# Patient Record
Sex: FEMALE | Race: WHITE | NOT HISPANIC OR LATINO | Employment: OTHER | URBAN - METROPOLITAN AREA
[De-identification: names, ages, dates, MRNs, and addresses within clinical notes are randomized per-mention and may not be internally consistent; named-entity substitution may affect disease eponyms.]

---

## 2023-04-05 DIAGNOSIS — K21.9 GASTROESOPHAGEAL REFLUX DISEASE WITHOUT ESOPHAGITIS: Primary | ICD-10-CM

## 2023-04-05 RX ORDER — PANTOPRAZOLE SODIUM 40 MG/1
40 TABLET, DELAYED RELEASE ORAL DAILY
Qty: 90 TABLET | Refills: 3 | Status: SHIPPED | OUTPATIENT
Start: 2023-04-05 | End: 2024-05-23 | Stop reason: SDUPTHER

## 2023-05-02 DIAGNOSIS — J30.9 ALLERGIC RHINITIS, UNSPECIFIED SEASONALITY, UNSPECIFIED TRIGGER: Primary | ICD-10-CM

## 2023-05-08 RX ORDER — MONTELUKAST SODIUM 10 MG/1
TABLET ORAL
Qty: 90 TABLET | Refills: 3 | Status: SHIPPED | OUTPATIENT
Start: 2023-05-08 | End: 2024-04-26 | Stop reason: SDUPTHER

## 2023-05-16 PROBLEM — J30.9 ALLERGIC RHINITIS: Status: ACTIVE | Noted: 2023-05-16

## 2023-05-16 PROBLEM — R05.3 CHRONIC COUGH: Status: ACTIVE | Noted: 2023-05-16

## 2023-05-16 PROBLEM — M19.90 OSTEOARTHRITIS: Status: ACTIVE | Noted: 2023-05-16

## 2023-05-16 PROBLEM — J45.909 ASTHMA (HHS-HCC): Status: ACTIVE | Noted: 2023-05-16

## 2023-05-16 PROBLEM — N28.9 RENAL INSUFFICIENCY: Status: ACTIVE | Noted: 2023-05-16

## 2023-05-16 PROBLEM — J47.9 BRONCHIECTASIS (MULTI): Status: ACTIVE | Noted: 2023-05-16

## 2023-05-16 PROBLEM — N94.89 PELVIC CONGESTION SYNDROME: Status: ACTIVE | Noted: 2023-05-16

## 2023-05-16 PROBLEM — R63.4 ABNORMAL WEIGHT LOSS: Status: ACTIVE | Noted: 2023-05-16

## 2023-05-16 PROBLEM — I73.00 RAYNAUD PHENOMENON: Status: ACTIVE | Noted: 2023-05-16

## 2023-05-16 PROBLEM — E04.1 COLLOID THYROID NODULE: Status: ACTIVE | Noted: 2023-05-16

## 2023-05-16 PROBLEM — M19.90 ARTHRITIS: Status: ACTIVE | Noted: 2023-05-16

## 2023-05-16 PROBLEM — E78.5 HYPERLIPIDEMIA: Status: ACTIVE | Noted: 2023-05-16

## 2023-05-16 PROBLEM — N18.30 CKD (CHRONIC KIDNEY DISEASE) STAGE 3, GFR 30-59 ML/MIN (MULTI): Status: ACTIVE | Noted: 2023-05-16

## 2023-05-16 PROBLEM — K21.9 GERD (GASTROESOPHAGEAL REFLUX DISEASE): Status: ACTIVE | Noted: 2023-05-16

## 2023-05-16 PROBLEM — R91.1 LUNG NODULE: Status: ACTIVE | Noted: 2023-05-16

## 2023-05-17 ENCOUNTER — OFFICE VISIT (OUTPATIENT)
Dept: PRIMARY CARE | Facility: CLINIC | Age: 78
End: 2023-05-17
Payer: MEDICARE

## 2023-05-17 VITALS
SYSTOLIC BLOOD PRESSURE: 118 MMHG | RESPIRATION RATE: 16 BRPM | OXYGEN SATURATION: 98 % | HEART RATE: 61 BPM | WEIGHT: 115 LBS | DIASTOLIC BLOOD PRESSURE: 70 MMHG | TEMPERATURE: 97.5 F | BODY MASS INDEX: 20.37 KG/M2

## 2023-05-17 DIAGNOSIS — E46 PROTEIN-CALORIE MALNUTRITION, UNSPECIFIED SEVERITY (MULTI): ICD-10-CM

## 2023-05-17 DIAGNOSIS — Z12.31 ENCOUNTER FOR SCREENING MAMMOGRAM FOR MALIGNANT NEOPLASM OF BREAST: Primary | ICD-10-CM

## 2023-05-17 DIAGNOSIS — J47.9 BRONCHIECTASIS WITHOUT COMPLICATION (MULTI): ICD-10-CM

## 2023-05-17 DIAGNOSIS — N18.31 STAGE 3A CHRONIC KIDNEY DISEASE (MULTI): ICD-10-CM

## 2023-05-17 PROCEDURE — 1170F FXNL STATUS ASSESSED: CPT | Performed by: INTERNAL MEDICINE

## 2023-05-17 PROCEDURE — G0439 PPPS, SUBSEQ VISIT: HCPCS | Performed by: INTERNAL MEDICINE

## 2023-05-17 PROCEDURE — 1157F ADVNC CARE PLAN IN RCRD: CPT | Performed by: INTERNAL MEDICINE

## 2023-05-17 PROCEDURE — 1036F TOBACCO NON-USER: CPT | Performed by: INTERNAL MEDICINE

## 2023-05-17 PROCEDURE — 1159F MED LIST DOCD IN RCRD: CPT | Performed by: INTERNAL MEDICINE

## 2023-05-17 PROCEDURE — 1160F RVW MEDS BY RX/DR IN RCRD: CPT | Performed by: INTERNAL MEDICINE

## 2023-05-17 RX ORDER — IPRATROPIUM BROMIDE 21 UG/1
1 SPRAY, METERED NASAL 2 TIMES DAILY
COMMUNITY
Start: 2018-09-26 | End: 2023-11-01 | Stop reason: ALTCHOICE

## 2023-05-17 RX ORDER — NAPROXEN 500 MG/1
500 TABLET ORAL
COMMUNITY
End: 2023-07-10 | Stop reason: SDUPTHER

## 2023-05-17 RX ORDER — LATANOPROST 50 UG/ML
1 SOLUTION/ DROPS OPHTHALMIC DAILY
COMMUNITY

## 2023-05-17 RX ORDER — FENOFIBRATE 200 MG/1
200 CAPSULE ORAL
COMMUNITY
End: 2023-07-31 | Stop reason: SDUPTHER

## 2023-05-17 RX ORDER — BRIMONIDINE TARTRATE, TIMOLOL MALEATE 2; 5 MG/ML; MG/ML
1 SOLUTION/ DROPS OPHTHALMIC DAILY
COMMUNITY

## 2023-05-17 ASSESSMENT — ACTIVITIES OF DAILY LIVING (ADL)
MANAGING_FINANCES: INDEPENDENT
TAKING_MEDICATION: INDEPENDENT
GROCERY_SHOPPING: INDEPENDENT
DRESSING: INDEPENDENT
DOING_HOUSEWORK: INDEPENDENT
BATHING: INDEPENDENT

## 2023-05-17 ASSESSMENT — ENCOUNTER SYMPTOMS
FREQUENCY: 1
NUMBNESS: 1
DIARRHEA: 0
COLOR CHANGE: 0
WEAKNESS: 0
HEADACHES: 0
BLOOD IN STOOL: 0
SHORTNESS OF BREATH: 1
CONSTIPATION: 0
UNEXPECTED WEIGHT CHANGE: 1

## 2023-05-17 ASSESSMENT — PATIENT HEALTH QUESTIONNAIRE - PHQ9
SUM OF ALL RESPONSES TO PHQ9 QUESTIONS 1 AND 2: 0
2. FEELING DOWN, DEPRESSED OR HOPELESS: NOT AT ALL
1. LITTLE INTEREST OR PLEASURE IN DOING THINGS: NOT AT ALL

## 2023-05-17 NOTE — PROGRESS NOTES
Patient here for a Medicare wellness visit / physical and follow up    Subjective   Patient ID: Susu Lackey is a 78 y.o. female who presents for Annual Exam, Follow-up, and Medicare Annual Wellness Visit Subsequent.    The patient reports occasional numbness and pins and needles in the plantar surface of the toes which she suspects is related to Raynaud's phenomenon.  Shas noticed that the symptoms are exacerbated with bare feet and is usually wearing stockings during the day.  She has started wearing compression stockings in the morning which have been helping.  She denies any lower limb weakness or discoloration in the feet.      The patient mentions mild ongoing chest congestion and dyspnea with significant exertion.  She denies any chest pain.  She continues to follow with Dr.Haj Sosa for a history of asthma and mild bronchiectasis.  A recent CT Chest for surveillance showed no significant interval change and is considering possible bronchoscopy in the future.     The patient notes two episodes of noticing and hearing her pulse on waking that resolved with getting up.  She denies any headache or vision changes.     The patient mentions some urinary frequency and nocturia of 2 times per evening that her baseline.     The patient reports a weight loss of 4 lbs since 2/2023 and has reduced her Boost Nutrition supplements to four times weekly.  She denies any hematochezia, melena, or bowel problems.      Review of Systems   Constitutional:  Positive for unexpected weight change.   Eyes:  Negative for visual disturbance.   Respiratory:  Positive for shortness of breath.    Gastrointestinal:  Negative for blood in stool, constipation and diarrhea.   Genitourinary:  Positive for frequency.        Positive for nocturia of 2 times per evening.   Skin:  Negative for color change.   Neurological:  Positive for numbness. Negative for weakness and headaches.       Objective   Physical Exam  Constitutional:        Appearance: Normal appearance.   Cardiovascular:      Rate and Rhythm: Normal rate and regular rhythm.      Pulses: Normal pulses.      Heart sounds: Normal heart sounds.   Pulmonary:      Effort: Pulmonary effort is normal.      Breath sounds: Normal breath sounds.   Abdominal:      General: Bowel sounds are normal.      Palpations: Abdomen is soft.      Tenderness: There is no abdominal tenderness.   Skin:     General: Skin is warm and dry.   Neurological:      General: No focal deficit present.      Mental Status: She is alert and oriented to person, place, and time. Mental status is at baseline.   Psychiatric:         Mood and Affect: Mood normal.         Behavior: Behavior normal.         Assessment/Plan       Medicare Wellness Examination Done  -  Discussed healthy diet and regular exercise.    -  Physical exam overall unremarkable. Immunizations reviewed and updated accordingly. Healthy lifestyle choices discussed (tobacco avoidance, appropriate alcohol use, avoidance of illicit substances).   -  Patient is wearing seatbelt.   -  Screening lab work ordered as indicated.    -  Age appropriate screening tests reviewed with patient.       IMPRESSION/PLAN :      Weight Loss  - Advised patient to discuss with  from Pulmonology during next visit as this may be elated to lung nodules/bronchiectasis.  Also advised patient to restart taking Boost Nutrition supplements as well.    Lower Limb Paresthesia  - Distal pulses intact.  Likely due to nerve impingement from OA.  Call the clinic if symptoms persist or worsen and will consider ordering Nerve conduction tests and EMG.    /70 in the office today.     Raynaud's  - Continue to monitor symptoms. If worsening, consider PVR/EFRAIN.     HLD   - Stable, maintained on fenofibrate 200mg QD.     Thyroid Nodule   - Thyroid US 12/2020 showed benign-appearing spongiform nodules and cysts <1cm. Last TSH wnl 11/2022.     Lung Nodules   - Chest CT 03/2022 showed  Areas of centrilobular and tree-in-bud nodularity bilaterally probably from bronchiolitis. Some nodularity also appears be due to chronic mucous plugging which has improved slightly. There is greater nodular pleural thickening focally at the left upper lung anteromedially, probably atelectasis or fibrosis. Mild bronchiectasis and chronic bronchitis at the apices. 4 mm right upper lung nodule is stable and initially seen on the exam of 06/16/2021. However, total 1 year follow-up is recommended. There has been stability of additional nodules. Following with Dr.Haj Sosa from Pulmonology.     Reflux   - Symptoms managed with pantoprazole 40mg QD.     OA   - Takes naproxen 500mg BID as needed for pain with food and water. Advised that she can replace one of the daily doses with Tylenol to prevent adverse renal effects.     Asthma   - Maintained on Atrovent HFA 21mcg/act 1-2 puffs BID, and Montelukast 10 mg QD. Following with Dr.Haj Sosa from Pulmonology.     Pelvic Congestion Syndrome   - Followed with Dr. Del Real in GYN.     Sinus Congestion   - Flonase is contraindicated due to glaucoma. Use nasal cleanser before using nasal spray at bed time.     Sleep Disturbance   - I have advised the patient start with Melatonin at 5 mg QHS and increase up to a maximum of 10 mg.     Health Maintenance   - Routine labs 11/2022. Last Mammogram 6/2022, ordered repeat for 2023. Cologuard negative 8/2021, due for repeat 2024.     Follow up in 6 months, call sooner if needed.        Scribe Attestation  By signing my name below, I, Shashi Presley   attest that this documentation has been prepared under the direction and in the presence of Norberto Sparks DO.

## 2023-06-27 ENCOUNTER — PATIENT OUTREACH (OUTPATIENT)
Dept: CARE COORDINATION | Facility: CLINIC | Age: 78
End: 2023-06-27
Payer: MEDICARE

## 2023-07-10 DIAGNOSIS — M19.90 OSTEOARTHRITIS, UNSPECIFIED OSTEOARTHRITIS TYPE, UNSPECIFIED SITE: Primary | ICD-10-CM

## 2023-07-10 RX ORDER — NAPROXEN 500 MG/1
500 TABLET ORAL
Qty: 60 TABLET | Refills: 0 | Status: SHIPPED | OUTPATIENT
Start: 2023-07-10 | End: 2023-09-08 | Stop reason: SDUPTHER

## 2023-07-31 DIAGNOSIS — E78.5 HYPERLIPIDEMIA, UNSPECIFIED HYPERLIPIDEMIA TYPE: ICD-10-CM

## 2023-07-31 RX ORDER — FENOFIBRATE 200 MG/1
200 CAPSULE ORAL
Qty: 90 CAPSULE | Refills: 3 | Status: SHIPPED | OUTPATIENT
Start: 2023-07-31

## 2023-09-08 DIAGNOSIS — M19.90 OSTEOARTHRITIS, UNSPECIFIED OSTEOARTHRITIS TYPE, UNSPECIFIED SITE: ICD-10-CM

## 2023-09-08 RX ORDER — NAPROXEN 500 MG/1
500 TABLET ORAL
Qty: 60 TABLET | Refills: 1 | Status: SHIPPED | OUTPATIENT
Start: 2023-09-08 | End: 2024-02-26 | Stop reason: SDUPTHER

## 2023-09-27 LAB
ACTIVATED PARTIAL THROMBOPLASTIN TIME IN PPP BY COAGULATION ASSAY: 33 SEC (ref 27–38)
ANION GAP IN SER/PLAS: 9 MMOL/L (ref 10–20)
BASOPHILS (10*3/UL) IN BLOOD BY AUTOMATED COUNT: 0.03 X10E9/L (ref 0–0.1)
BASOPHILS/100 LEUKOCYTES IN BLOOD BY AUTOMATED COUNT: 0.5 % (ref 0–2)
CALCIUM (MG/DL) IN SER/PLAS: 9.8 MG/DL (ref 8.6–10.3)
CARBON DIOXIDE, TOTAL (MMOL/L) IN SER/PLAS: 31 MMOL/L (ref 21–32)
CHLORIDE (MMOL/L) IN SER/PLAS: 100 MMOL/L (ref 98–107)
CREATININE (MG/DL) IN SER/PLAS: 1.05 MG/DL (ref 0.5–1.05)
EOSINOPHILS (10*3/UL) IN BLOOD BY AUTOMATED COUNT: 0.11 X10E9/L (ref 0–0.4)
EOSINOPHILS/100 LEUKOCYTES IN BLOOD BY AUTOMATED COUNT: 1.9 % (ref 0–6)
ERYTHROCYTE DISTRIBUTION WIDTH (RATIO) BY AUTOMATED COUNT: 13.8 % (ref 11.5–14.5)
ERYTHROCYTE MEAN CORPUSCULAR HEMOGLOBIN CONCENTRATION (G/DL) BY AUTOMATED: 32.4 G/DL (ref 32–36)
ERYTHROCYTE MEAN CORPUSCULAR VOLUME (FL) BY AUTOMATED COUNT: 99 FL (ref 80–100)
ERYTHROCYTES (10*6/UL) IN BLOOD BY AUTOMATED COUNT: 4.47 X10E12/L (ref 4–5.2)
GFR FEMALE: 54 ML/MIN/1.73M2
GLUCOSE (MG/DL) IN SER/PLAS: 99 MG/DL (ref 74–99)
HEMATOCRIT (%) IN BLOOD BY AUTOMATED COUNT: 44.2 % (ref 36–46)
HEMOGLOBIN (G/DL) IN BLOOD: 14.3 G/DL (ref 12–16)
IGE (IU/L) IN SERUM OR PLASMA: 7 IU/ML (ref 0–214)
IMMATURE GRANULOCYTES/100 LEUKOCYTES IN BLOOD BY AUTOMATED COUNT: 0.2 % (ref 0–0.9)
INR IN PPP BY COAGULATION ASSAY: 1.1 (ref 0.9–1.1)
LEUKOCYTES (10*3/UL) IN BLOOD BY AUTOMATED COUNT: 5.7 X10E9/L (ref 4.4–11.3)
LYMPHOCYTES (10*3/UL) IN BLOOD BY AUTOMATED COUNT: 0.86 X10E9/L (ref 0.8–3)
LYMPHOCYTES/100 LEUKOCYTES IN BLOOD BY AUTOMATED COUNT: 15.1 % (ref 13–44)
MONOCYTES (10*3/UL) IN BLOOD BY AUTOMATED COUNT: 0.7 X10E9/L (ref 0.05–0.8)
MONOCYTES/100 LEUKOCYTES IN BLOOD BY AUTOMATED COUNT: 12.3 % (ref 2–10)
NEUTROPHILS (10*3/UL) IN BLOOD BY AUTOMATED COUNT: 4 X10E9/L (ref 1.6–5.5)
NEUTROPHILS/100 LEUKOCYTES IN BLOOD BY AUTOMATED COUNT: 70 % (ref 40–80)
NRBC (PER 100 WBCS) BY AUTOMATED COUNT: 0 /100 WBC (ref 0–0)
PLATELETS (10*3/UL) IN BLOOD AUTOMATED COUNT: 257 X10E9/L (ref 150–450)
POTASSIUM (MMOL/L) IN SER/PLAS: 4.4 MMOL/L (ref 3.5–5.3)
PROTHROMBIN TIME (PT) IN PPP BY COAGULATION ASSAY: 12 SEC (ref 9.8–12.8)
SODIUM (MMOL/L) IN SER/PLAS: 136 MMOL/L (ref 136–145)
UREA NITROGEN (MG/DL) IN SER/PLAS: 26 MG/DL (ref 6–23)

## 2023-09-28 LAB
ALLERGEN ANIMAL: CAT DANDER IGE (KU/L): <0.1 KU/L
ALLERGEN ANIMAL: DOG DANDER IGE (KU/L): <0.1 KU/L
ALLERGEN GRASS: BERMUDA GRASS (CYNODON DACTYLON) IGE (KU/L): <0.1 KU/L
ALLERGEN GRASS: JOHNSON GRASS (SORGHUM HALEPENSE) IGE (KU/L): <0.1 KU/L
ALLERGEN GRASS: MEADOW GRASS, KENTUCKY BLUE (POA PRATENSIS )IGE (KU/L): <0.1 KU/L
ALLERGEN GRASS: TIMOTHY GRASS (PHLEUM PRATENSE) IGE (KU/L): <0.1 KU/L
ALLERGEN INSECT: COCKROACH IGE: <0.1 KU/L
ALLERGEN MICROORGANISM: ALTERNARIA ALTERNATA IGE (KU/L): <0.1 KU/L
ALLERGEN MICROORGANISM: ASPERGILLUS FUMIGATUS IGE (KU/L): <0.1 KU/L
ALLERGEN MICROORGANISM: CLADOSPORIUM HERBARUM IGE (KU/L): <0.1 KU/L
ALLERGEN MICROORGANISM: PENICILLIUM CHRYSOGENUM (P. NOTATUM) IGE (KU/L): <0.1 KU/L
ALLERGEN MITE: DERMATOPHAGOIDES FARINAE (HOUSE DUST MITE) IGE (KU/L): <0.1 KU/L
ALLERGEN MITE: DERMATOPHAGOIDES PTERONYSSINUS (HOUSE DUST MITE) IGE (KU/L): <0.1 KU/L
ALLERGEN TREE: BOX-ELDER (ACER NEGUNDO) IGE (KU/L): <0.1 KU/L
ALLERGEN TREE: COMMON SILVER BIRCH (BETULA VERRUCOSA) IGE (KU/L): <0.1 KU/L
ALLERGEN TREE: COTTONWOOD (POPULUS DELTOIDES) IGE (KU/L): <0.1 KU/L
ALLERGEN TREE: ELM (ULMUS AMERICANA) IGE (KU/L): <0.1 KU/L
ALLERGEN TREE: MAPLE LEAF SYCAMORE, LONDON PLANE IGE (KU/L): <0.1 KU/L
ALLERGEN TREE: MOUNTAIN JUNIPER (JUNIPERUS SABINOIDES) IGE (KU/L): <0.1 KU/L
ALLERGEN TREE: MULBERRY (MORUS ALBA) IGE (KU/L): <0.1 KU/L
ALLERGEN TREE: OAK (QUERCUS ALBA) IGE (KU/L): <0.1 KU/L
ALLERGEN TREE: PECAN, HICKORY (CARYA PECAN) IGE (KU/L): <0.1 KU/L
ALLERGEN TREE: WALNUT IGE: <0.1 KU/L
ALLERGEN TREE: WHITE ASH (FRAXINUS AMERICANA) IGE (KU/L): <0.1 KU/L
ALLERGEN WEED: COMMON PIGWEED (AMARANTHUS RETROFLEXUS) IGE (KU/L): <0.1 KU/L
ALLERGEN WEED: COMMON RAGWEED (AMB. ARTEMISIIFOLIA/A. ELATIOR) IGE (KU/L): <0.1 KU/L
ALLERGEN WEED: GOOSEFOOT, LAMB'S QUARTERS (CHENOPODIUM ALBUM) IGE (KU/L): <0.1 KU/L
ALLERGEN WEED: PLANTAIN (ENGLISH), RIBWORT (PLANTAGO LANCEOLATA) IGE (KU/L): <0.1 KU/L
ALLERGEN WEED: PRICKLY SALTWORT/RUSSIAN THISTLE (SALSOLA KALI) IGE (KU/L): <0.1 KU/L
ALLERGEN WEED: SHEEP SORREL (RUMEX ACETOSELLA) IGE (KU/L): <0.1 KU/L
IMMUNOCAP IGE: 11.4 KU/L (ref 0–214)
IMMUNOCAP INTERPRETATION: NORMAL

## 2023-10-10 ENCOUNTER — TELEPHONE (OUTPATIENT)
Dept: PULMONOLOGY | Facility: CLINIC | Age: 78
End: 2023-10-10
Payer: MEDICARE

## 2023-10-10 NOTE — TELEPHONE ENCOUNTER
Spoke with patient.    She stated she seen Alicia on 9/21/23.    Patient stated she was supposed to be scheduled for a Bronchoscopy after this visit and has not been called to be scheduled.    Plan:  We discussed today that bronchoscopy could be done with bronchoalveolar lavage to evaluate for possible chronic respiratory infections though given her clinical and radiographic stability over the past 2 years, we decided to continue observation for now. Susu is informed to call my office back should she develop new respiratory symptoms. We will follow-up in 2 weeks after Bronch        pre-bronch labs -cbc, coags, and basic metabolic panel  Due to allergy type symptoms will review CBC w diff, Ig E and RAST panel        I do not see an order for Bronchoscopy in Psychiatric or [facility].    Please advise , please place order if appropriate.    Thanks

## 2023-10-11 ENCOUNTER — TELEPHONE (OUTPATIENT)
Dept: PULMONOLOGY | Facility: HOSPITAL | Age: 78
End: 2023-10-11
Payer: MEDICARE

## 2023-10-11 NOTE — TELEPHONE ENCOUNTER
Alicia stated she spoke with you regarding this patient needing bronchoscopy. I will need an order submitted.

## 2023-10-11 NOTE — TELEPHONE ENCOUNTER
Spoke with Sonam    She stated she send a msg to  to place an order for the Bronchoscopy.    I called the patient and gave her an update.              Alicia Linares, APRN-CNP  You; Sonam Baldwin, CMA20 hours ago (2:05 PM)       Wesley Masters,    This is the patient that Wyatt said he would do a bronchoscopy. Has this been scheduled? There needs an order.  Thanks  Alicia

## 2023-10-12 DIAGNOSIS — R91.1 LUNG NODULE: Primary | ICD-10-CM

## 2023-10-12 NOTE — TELEPHONE ENCOUNTER
,    Can you please place order for Bronchoscopy  so that we may assist in scheduling.    Thank you

## 2023-10-13 NOTE — TELEPHONE ENCOUNTER
Jennifer Sosa MD  You18 hours ago (5:49 PM)       Bronchoscopy order is in. Thank you       Sonam can you please assist in getting bronchoscopy scheduled.    Thanks

## 2023-10-25 ENCOUNTER — APPOINTMENT (OUTPATIENT)
Dept: PULMONOLOGY | Facility: CLINIC | Age: 78
End: 2023-10-25
Payer: MEDICARE

## 2023-11-01 ENCOUNTER — ANESTHESIA EVENT (OUTPATIENT)
Dept: GASTROENTEROLOGY | Facility: HOSPITAL | Age: 78
End: 2023-11-01
Payer: MEDICARE

## 2023-11-01 ENCOUNTER — APPOINTMENT (OUTPATIENT)
Dept: PULMONOLOGY | Facility: CLINIC | Age: 78
End: 2023-11-01
Payer: MEDICARE

## 2023-11-01 ENCOUNTER — HOSPITAL ENCOUNTER (OUTPATIENT)
Dept: GASTROENTEROLOGY | Facility: HOSPITAL | Age: 78
Discharge: HOME | End: 2023-11-01
Payer: MEDICARE

## 2023-11-01 ENCOUNTER — ANESTHESIA (OUTPATIENT)
Dept: GASTROENTEROLOGY | Facility: HOSPITAL | Age: 78
End: 2023-11-01
Payer: MEDICARE

## 2023-11-01 VITALS
WEIGHT: 110 LBS | RESPIRATION RATE: 18 BRPM | DIASTOLIC BLOOD PRESSURE: 80 MMHG | HEIGHT: 63 IN | TEMPERATURE: 97.9 F | BODY MASS INDEX: 19.49 KG/M2 | HEART RATE: 87 BPM | SYSTOLIC BLOOD PRESSURE: 158 MMHG | OXYGEN SATURATION: 94 %

## 2023-11-01 DIAGNOSIS — R91.1 LUNG NODULE: ICD-10-CM

## 2023-11-01 DIAGNOSIS — J47.9 BRONCHIECTASIS WITHOUT COMPLICATION (MULTI): Primary | ICD-10-CM

## 2023-11-01 PROCEDURE — 7100000010 HC PHASE TWO TIME - EACH INCREMENTAL 1 MINUTE

## 2023-11-01 PROCEDURE — 87186 SC STD MICRODIL/AGAR DIL: CPT | Mod: STJLAB | Performed by: INTERNAL MEDICINE

## 2023-11-01 PROCEDURE — 87799 DETECT AGENT NOS DNA QUANT: CPT | Performed by: INTERNAL MEDICINE

## 2023-11-01 PROCEDURE — 87632 RESP VIRUS 6-11 TARGETS: CPT | Performed by: INTERNAL MEDICINE

## 2023-11-01 PROCEDURE — 87075 CULTR BACTERIA EXCEPT BLOOD: CPT | Mod: STJLAB | Performed by: INTERNAL MEDICINE

## 2023-11-01 PROCEDURE — 2500000002 HC RX 250 W HCPCS SELF ADMINISTERED DRUGS (ALT 637 FOR MEDICARE OP, ALT 636 FOR OP/ED): Performed by: ANESTHESIOLOGY

## 2023-11-01 PROCEDURE — 3700000001 HC GENERAL ANESTHESIA TIME - INITIAL BASE CHARGE

## 2023-11-01 PROCEDURE — A31624 PR BRONCHOSCOPY,DIAGNOSTIC W LAVAGE: Performed by: ANESTHESIOLOGY

## 2023-11-01 PROCEDURE — 87205 SMEAR GRAM STAIN: CPT | Mod: STJLAB | Performed by: INTERNAL MEDICINE

## 2023-11-01 PROCEDURE — 3700000002 HC GENERAL ANESTHESIA TIME - EACH INCREMENTAL 1 MINUTE

## 2023-11-01 PROCEDURE — 31624 DX BRONCHOSCOPE/LAVAGE: CPT

## 2023-11-01 PROCEDURE — 99100 ANES PT EXTEME AGE<1 YR&>70: CPT | Performed by: ANESTHESIOLOGY

## 2023-11-01 PROCEDURE — 31624 DX BRONCHOSCOPE/LAVAGE: CPT | Performed by: INTERNAL MEDICINE

## 2023-11-01 PROCEDURE — 7100000009 HC PHASE TWO TIME - INITIAL BASE CHARGE

## 2023-11-01 PROCEDURE — 7100000001 HC RECOVERY ROOM TIME - INITIAL BASE CHARGE

## 2023-11-01 PROCEDURE — 88112 CYTOPATH CELL ENHANCE TECH: CPT | Performed by: PATHOLOGY

## 2023-11-01 PROCEDURE — 87102 FUNGUS ISOLATION CULTURE: CPT | Mod: STJLAB | Performed by: INTERNAL MEDICINE

## 2023-11-01 PROCEDURE — 87206 SMEAR FLUORESCENT/ACID STAI: CPT | Mod: STJLAB | Performed by: INTERNAL MEDICINE

## 2023-11-01 PROCEDURE — 88112 CYTOPATH CELL ENHANCE TECH: CPT | Mod: TC,MCY,STJLAB | Performed by: INTERNAL MEDICINE

## 2023-11-01 PROCEDURE — 2500000005 HC RX 250 GENERAL PHARMACY W/O HCPCS: Performed by: ANESTHESIOLOGY

## 2023-11-01 PROCEDURE — 87801 DETECT AGNT MULT DNA AMPLI: CPT | Performed by: INTERNAL MEDICINE

## 2023-11-01 PROCEDURE — 94640 AIRWAY INHALATION TREATMENT: CPT

## 2023-11-01 PROCEDURE — 87305 ASPERGILLUS AG IA: CPT | Performed by: INTERNAL MEDICINE

## 2023-11-01 PROCEDURE — 88312 SPECIAL STAINS GROUP 1: CPT | Mod: TC,STJLAB | Performed by: INTERNAL MEDICINE

## 2023-11-01 PROCEDURE — 2500000004 HC RX 250 GENERAL PHARMACY W/ HCPCS (ALT 636 FOR OP/ED): Performed by: ANESTHESIOLOGY

## 2023-11-01 PROCEDURE — 87116 MYCOBACTERIA CULTURE: CPT | Mod: STJLAB | Performed by: INTERNAL MEDICINE

## 2023-11-01 PROCEDURE — 7100000002 HC RECOVERY ROOM TIME - EACH INCREMENTAL 1 MINUTE

## 2023-11-01 PROCEDURE — 87581 M.PNEUMON DNA AMP PROBE: CPT | Performed by: INTERNAL MEDICINE

## 2023-11-01 PROCEDURE — 88312 SPECIAL STAINS GROUP 1: CPT | Performed by: PATHOLOGY

## 2023-11-01 PROCEDURE — 87015 SPECIMEN INFECT AGNT CONCNTJ: CPT | Mod: STJLAB | Performed by: INTERNAL MEDICINE

## 2023-11-01 PROCEDURE — 87533 HHV-6 DNA QUANT: CPT | Performed by: INTERNAL MEDICINE

## 2023-11-01 RX ORDER — OXYCODONE HYDROCHLORIDE 10 MG/1
10 TABLET ORAL EVERY 4 HOURS PRN
Status: DISCONTINUED | OUTPATIENT
Start: 2023-11-01 | End: 2023-11-02 | Stop reason: HOSPADM

## 2023-11-01 RX ORDER — FENTANYL CITRATE 50 UG/ML
50 INJECTION, SOLUTION INTRAMUSCULAR; INTRAVENOUS EVERY 5 MIN PRN
Status: DISCONTINUED | OUTPATIENT
Start: 2023-11-01 | End: 2023-11-02 | Stop reason: HOSPADM

## 2023-11-01 RX ORDER — PROPOFOL 10 MG/ML
INJECTION, EMULSION INTRAVENOUS AS NEEDED
Status: DISCONTINUED | OUTPATIENT
Start: 2023-11-01 | End: 2023-11-01

## 2023-11-01 RX ORDER — IPRATROPIUM BROMIDE 21 UG/1
1 SPRAY, METERED NASAL EVERY 12 HOURS
COMMUNITY
End: 2024-05-23 | Stop reason: SDUPTHER

## 2023-11-01 RX ORDER — LIDOCAINE HYDROCHLORIDE 20 MG/ML
INJECTION, SOLUTION INFILTRATION; PERINEURAL AS NEEDED
Status: DISCONTINUED | OUTPATIENT
Start: 2023-11-01 | End: 2023-11-01

## 2023-11-01 RX ORDER — DEXAMETHASONE SODIUM PHOSPHATE 4 MG/ML
INJECTION, SOLUTION INTRA-ARTICULAR; INTRALESIONAL; INTRAMUSCULAR; INTRAVENOUS; SOFT TISSUE AS NEEDED
Status: DISCONTINUED | OUTPATIENT
Start: 2023-11-01 | End: 2023-11-01

## 2023-11-01 RX ORDER — PHENYLEPHRINE HCL IN 0.9% NACL 1 MG/10 ML
SYRINGE (ML) INTRAVENOUS AS NEEDED
Status: DISCONTINUED | OUTPATIENT
Start: 2023-11-01 | End: 2023-11-01

## 2023-11-01 RX ORDER — ALBUTEROL SULFATE 0.83 MG/ML
2.5 SOLUTION RESPIRATORY (INHALATION) ONCE AS NEEDED
Status: COMPLETED | OUTPATIENT
Start: 2023-11-01 | End: 2023-11-01

## 2023-11-01 RX ORDER — SUCCINYLCHOLINE CHLORIDE 100 MG/5ML
SYRINGE (ML) INTRAVENOUS AS NEEDED
Status: DISCONTINUED | OUTPATIENT
Start: 2023-11-01 | End: 2023-11-01

## 2023-11-01 RX ORDER — ACETAMINOPHEN 325 MG/1
650 TABLET ORAL EVERY 4 HOURS PRN
Status: DISCONTINUED | OUTPATIENT
Start: 2023-11-01 | End: 2023-11-02 | Stop reason: HOSPADM

## 2023-11-01 RX ORDER — ACETAMINOPHEN 325 MG/1
975 TABLET ORAL ONCE
Status: DISCONTINUED | OUTPATIENT
Start: 2023-11-01 | End: 2023-11-02 | Stop reason: HOSPADM

## 2023-11-01 RX ORDER — HYDROCODONE BITARTRATE AND ACETAMINOPHEN 5; 325 MG/1; MG/1
1 TABLET ORAL EVERY 4 HOURS PRN
Status: DISCONTINUED | OUTPATIENT
Start: 2023-11-01 | End: 2023-11-02 | Stop reason: HOSPADM

## 2023-11-01 RX ORDER — ONDANSETRON HYDROCHLORIDE 2 MG/ML
4 INJECTION, SOLUTION INTRAVENOUS ONCE AS NEEDED
Status: DISCONTINUED | OUTPATIENT
Start: 2023-11-01 | End: 2023-11-02 | Stop reason: HOSPADM

## 2023-11-01 RX ORDER — SODIUM CITRATE AND CITRIC ACID MONOHYDRATE 334; 500 MG/5ML; MG/5ML
30 SOLUTION ORAL ONCE
Status: DISCONTINUED | OUTPATIENT
Start: 2023-11-01 | End: 2023-11-02 | Stop reason: HOSPADM

## 2023-11-01 RX ORDER — FENTANYL CITRATE 50 UG/ML
25 INJECTION, SOLUTION INTRAMUSCULAR; INTRAVENOUS EVERY 5 MIN PRN
Status: DISCONTINUED | OUTPATIENT
Start: 2023-11-01 | End: 2023-11-02 | Stop reason: HOSPADM

## 2023-11-01 RX ORDER — SODIUM CHLORIDE, SODIUM LACTATE, POTASSIUM CHLORIDE, CALCIUM CHLORIDE 600; 310; 30; 20 MG/100ML; MG/100ML; MG/100ML; MG/100ML
100 INJECTION, SOLUTION INTRAVENOUS CONTINUOUS
Status: DISCONTINUED | OUTPATIENT
Start: 2023-11-01 | End: 2023-11-02 | Stop reason: HOSPADM

## 2023-11-01 RX ORDER — HYDRALAZINE HYDROCHLORIDE 20 MG/ML
5 INJECTION INTRAMUSCULAR; INTRAVENOUS EVERY 30 MIN PRN
Status: DISCONTINUED | OUTPATIENT
Start: 2023-11-01 | End: 2023-11-02 | Stop reason: HOSPADM

## 2023-11-01 RX ORDER — ONDANSETRON HYDROCHLORIDE 2 MG/ML
INJECTION, SOLUTION INTRAVENOUS AS NEEDED
Status: DISCONTINUED | OUTPATIENT
Start: 2023-11-01 | End: 2023-11-01

## 2023-11-01 RX ADMIN — ONDANSETRON 4 MG: 2 INJECTION INTRAMUSCULAR; INTRAVENOUS at 14:09

## 2023-11-01 RX ADMIN — PROPOFOL 50 MG: 10 INJECTION, EMULSION INTRAVENOUS at 14:10

## 2023-11-01 RX ADMIN — Medication 200 MCG: at 14:19

## 2023-11-01 RX ADMIN — DEXAMETHASONE SODIUM PHOSPHATE 8 MG: 4 INJECTION, SOLUTION INTRAMUSCULAR; INTRAVENOUS at 14:09

## 2023-11-01 RX ADMIN — LIDOCAINE HYDROCHLORIDE 50 MG: 20 INJECTION, SOLUTION INFILTRATION; PERINEURAL at 13:56

## 2023-11-01 RX ADMIN — SODIUM CHLORIDE, SODIUM LACTATE, POTASSIUM CHLORIDE, AND CALCIUM CHLORIDE: 600; 310; 30; 20 INJECTION, SOLUTION INTRAVENOUS at 13:50

## 2023-11-01 RX ADMIN — Medication 100 MG: at 13:58

## 2023-11-01 RX ADMIN — ALBUTEROL SULFATE 2.5 MG: 2.5 SOLUTION RESPIRATORY (INHALATION) at 14:55

## 2023-11-01 RX ADMIN — PROPOFOL 100 MG: 10 INJECTION, EMULSION INTRAVENOUS at 13:56

## 2023-11-01 SDOH — HEALTH STABILITY: MENTAL HEALTH: CURRENT SMOKER: 0

## 2023-11-01 ASSESSMENT — PAIN SCALES - GENERAL
PAINLEVEL_OUTOF10: 0 - NO PAIN
PAIN_LEVEL: 0
PAINLEVEL_OUTOF10: 0 - NO PAIN

## 2023-11-01 ASSESSMENT — PAIN - FUNCTIONAL ASSESSMENT
PAIN_FUNCTIONAL_ASSESSMENT: 0-10

## 2023-11-01 NOTE — ANESTHESIA PREPROCEDURE EVALUATION
Patient: Susu Lackey    Procedure Information       Date/Time: 11/01/23 1300    Scheduled providers: Jennifer Sosa MD    Procedure: BRONCHOSCOPY    Location: [facility]; [facility]            Relevant Problems   Cardiovascular   (+) Hyperlipidemia      Endocrine   (+) Colloid thyroid nodule      GI   (+) GERD (gastroesophageal reflux disease)      /Renal   (+) CKD (chronic kidney disease) stage 3, GFR 30-59 ml/min (CMS/HCC)   (+) Renal insufficiency      Pulmonary   (+) Asthma      Musculoskeletal   (+) Osteoarthritis      Other   (+) Arthritis       Clinical information reviewed:   Tobacco  Allergies  Meds  Problems  Med Hx  Surg Hx  OB Status    Fam Hx  Soc Hx        NPO Detail:  NPO/Void Status  Carbonhydrate Drink Given Prior to Surgery? : N  Date of Last Liquid: 10/31/23  Time of Last Liquid: 2330  Date of Last Solid: 10/31/23  Time of Last Solid: 2100  Last Intake Type: Clear fluids  Time of Last Void: 1150         Physical Exam    Airway  Mallampati: II  TM distance: >3 FB  Neck ROM: full     Cardiovascular - normal exam  Rhythm: regular  Rate: normal     Dental - normal exam     Pulmonary - normal exam  Breath sounds clear to auscultation     Abdominal   Abdomen: soft  Bowel sounds: normal           Anesthesia Plan    ASA 3     general     The patient is not a current smoker.  Patient was previously instructed to abstain from smoking on day of procedure.  Patient did not smoke on day of procedure.  Education provided regarding risk of obstructive sleep apnea.  intravenous induction   Anesthetic plan and risks discussed with patient.  Use of blood products discussed with patient who consented to blood products.    Plan discussed with attending and CAA.

## 2023-11-01 NOTE — ANESTHESIA PROCEDURE NOTES
Airway  Date/Time: 11/1/2023 2:00 PM  Urgency: elective      Staffing  Performed: attending   Authorized by: Tracey Brownlee MD    Performed by: Tracey Brownlee MD  Patient location during procedure: OR    Indications and Patient Condition  Indications for airway management: anesthesia and airway protection  Spontaneous ventilation: present  Sedation level: deep  Preoxygenated: yes  Patient position: sniffing  MILS maintained throughout  Mask difficulty assessment: 0 - not attempted  Planned trial extubation    Final Airway Details  Final airway type: endotracheal airway      Successful airway: ETT  Cuffed: yes   Successful intubation technique: lighted stylet  Endotracheal tube insertion site: oral  Blade: Antwon  Blade size: #3  ETT size (mm): 7.5  Placement verified by: chest auscultation and capnometry   Inital cuff pressure (cm H2O): 12  Cuff volume (mL): 6  Measured from: gums  ETT to gums (cm): 22  Ventilation between attempts: spontaneous  Number of other approaches attempted: 0

## 2023-11-01 NOTE — ANESTHESIA POSTPROCEDURE EVALUATION
36.5Patient: Susu MICHAEL Ziyad    Procedure Summary       Date: 11/01/23 Room / Location: [facility]; [facility]    Anesthesia Start: 1350 Anesthesia Stop: 1438    Procedure: BRONCHOSCOPY Diagnosis:       Lung nodule      Bronchiectasis without complication (CMS/[facility])    Scheduled Providers: Jennifer Sosa MD Responsible Provider: Tracey Brownlee MD    Anesthesia Type: general ASA Status: 3            Anesthesia Type: general    Vitals Value Taken Time   /76 11/01/23 1433   Temp 36.5 11/01/23 1438   Pulse 81 11/01/23 1437   Resp 27 11/01/23 1437   SpO2 96 % 11/01/23 1437   Vitals shown include unvalidated device data.    Anesthesia Post Evaluation    Patient location during evaluation: PACU  Patient participation: complete - patient participated  Level of consciousness: sleepy but conscious  Pain score: 0  Pain management: adequate  Airway patency: patent  Two or more strategies used to mitigate risk of obstructive sleep apnea  Cardiovascular status: acceptable and hemodynamically stable  Respiratory status: acceptable, unassisted, nasal cannula, spontaneous ventilation, nonlabored ventilation and airway suctioned  Hydration status: balanced        There were no known notable events for this encounter.

## 2023-11-01 NOTE — H&P
History Of Present Illness  Mrs. Lackey is a 77-year-old female with past medical history significant for intermittent asthma who presented to the office today regarding multiple pulmonary nodules noted on a CT scan of chest.      The patient has been following with primary care physician and GI clinics for unintentional weight loss over the past 2 years. During work-up CT scan of chest revealed waxing and waning centrilobular nodular infiltrates that was concerning for possible chronic respiratory infection. The patient is a lifetime non-smoker. She denies high risk occupational exposures, was a  until she retired. She is independent in her daily activities and denies limiting shortness of breath. She reports occasional cough and chest congestion. She denies recurrent fevers or night sweats. She was started on boost cans by GI and since then she reports she has been gaining weight back again. Most recent CT scan of chest in September 2020 is reviewed today which revealed again waxing and waning centrilobular nodular infiltrate.     No past history of connective tissue disease  No family history of chronic lung disease reported.     Follow up 5/11/2021:  No acute respiratory symptoms today. She did not have productive cough to submit sputum after last visit. She denies recurrent fevers or night sweats. She has gained about 6 pounds. No acute chest pain or hemoptysis. She reports occasional wheezing with weather changes from asthma. She has not been using any bronchodilators. She has history of glaucoma for which she is following up with ophthalmology on regular basis. She reports arthritis in her hands and spine that is attributed to osteoarthritis in the past.     Follow-up 7/29/21:  No acute respiratory symptoms again today. Susu reports occasional cough that is mostly dry. She did not submit any sputum for cultures. She denies recurrent fevers or night sweats. Her weight has been stable. Using albuterol  only as needed. Repeat CT scan of chest revealed wax and wane pulmonary nodules. Results are discussed with patient in details today.     Follow up 3/15/2022:  Susu stable respiratory status since last visit. Weight has been stable. No recurrent fevers or night sweats. She has occasional cough mainly in the afternoon with postnasal drainage. No purulent sputum production or hemoptysis. She is up-to-date with vaccinations.     Follow-up 4/21/2022:  (phone visit)  Susu reports a stable respiratory status. No acute fevers, recurrent chills or night sweats. Weight has been stable. Cough is mostly dry and is not persistent. Repeated CT scan of chest is reviewed today which revealed overall stability in the centrilobular nodularity and mild bronchiectasis. We discussed today that the stability of CT scan over the past 2-3 years is reassuring. Etiology could be related to chronic respiratory infection though she does not have any signs/symptoms of active infection right now. Immunoglobulin levels are within normal range. We discussed today that bronchoscopy with bronchoalveolar lavage would be the next step to further evaluate for chronic infection though given that her symptoms are stable now we could continue observation as well. Bronchoscopy is deferred and we will follow-up with her in the next 4-6 months or sooner if necessary. She is informed to call my office should she develop new respiratory symptoms.     Please excuse any misspellings or unintended errors related to the Dragon speech recognition software used to dictate this note.      Follow-up 10/21/2022:  No acute respiratory symptoms today or interval history of recurrent fevers, chills or night sweats. No weight loss. Denies any hemoptysis. She reports intermittent cough that is minimally productive. No change in respiratory status over the past year.     Follow-up 2/14/2023:  Susu reports stable respiratory status since last visit. Most recent CT scan  of chest is reviewed today which revealed stable pulmonary nodules when compared to his previous scan. She denies recurrent fevers, persistent cough or sputum production. No night sweats. No change in her weight over the past year. She is able to conduct her daily activities without respiratory limitation.     Follow up 11/1/2023:  Most recent CT scan of chest showed waxing and waning areas of pulmonary nodularity with diffuse bronchial thickening. Plan is for Bronchoscopy with BAL and possible Tbbx today.        Past Medical History  Past Medical History:   Diagnosis Date    Abnormal findings on diagnostic imaging of other specified body structures 11/22/2017    Abnormal CT scan, neck    Encounter for screening mammogram for malignant neoplasm of breast     Visit for screening mammogram    Epigastric pain 10/08/2019    Acute epigastric pain    Gastro-esophageal reflux disease with esophagitis, without bleeding 12/13/2019    Reflux esophagitis    Nonscarring hair loss, unspecified 12/13/2019    Hair loss    Other acute recurrent sinusitis 11/07/2017    Other acute recurrent sinusitis    Other conditions influencing health status 12/13/2019    Dry mucous membranes    Other symptoms and signs involving general sensations and perceptions 09/28/2018    Facial pressure    Personal history of other diseases of the nervous system and sense organs     History of glaucoma    Personal history of other diseases of the respiratory system 10/08/2014    Personal history of sinusitis    Personal history of other diseases of the respiratory system 11/14/2018    History of chronic sinusitis    Personal history of other diseases of the respiratory system 09/28/2018    History of deviated nasal septum    Personal history of other diseases of the respiratory system     History of asthma    Personal history of other medical treatment 05/08/2017    History of screening mammography    Personal history of other medical treatment 05/15/2019     History of screening mammography    Personal history of other specified conditions 09/26/2018    History of postnasal drip    Personal history of other specified conditions 05/24/2018    History of lymphadenopathy    Personal history of other specified conditions 08/20/2020    History of abdominal pain    Vulvar cyst 07/17/2014    Vulvar cyst       Surgical History  Past Surgical History:   Procedure Laterality Date    COLONOSCOPY  10/29/2012    Complete Colonoscopy    FOOT SURGERY  10/29/2012    Foot Surgery    OTHER SURGICAL HISTORY  01/11/2014    Vaginal Pap smear    TONSILLECTOMY  11/12/2012    Tonsillectomy        Social History  She reports that she has never smoked. She has never used smokeless tobacco. She reports current alcohol use. She reports that she does not use drugs.    Family History  Family History   Problem Relation Name Age of Onset    Hemolytic uremic syndrome Father      Breast cancer Sister          Allergies  Dog dander, Feathers, and House dust mite     Physical Exam:  Constitutional: Alert and in no acute distress. Well developed, well nourished.   Ears, Nose, Mouth, and Throat: External inspection of ears and nose: Normal.    Pulmonary: Chest: Normal to inspection.  Respiratory effort: No increased work of breathing or signs of respiratory distress.  Auscultation of lungs: Clear to auscultation bilaterally.    Cardiovascular: Heart rate and rhythm were normal, normal S1 and S2, no gallops, no murmurs and no pericardial rub. No peripheral edema.   Abdomen: Normal bowel sounds.   Musculoskeletal: the appearance of the knees was abnormal. Arthritis in her MCPs in both hands with medial deviation.   Psychiatric: Judgment and insight: Intact. Alert and oriented to person, place and time. Mood and affect: Normal.      Last Recorded Vitals  Admission vitals are reviewed today.    Relevant Results  Medication Documentation Review Audit       Reviewed by Norberto Sparks DO (Physician) on  05/17/23 at 1056      Medication Order Taking? Sig Documenting Provider Last Dose Status   Combigan 0.2-0.5 % ophthalmic solution [id number] Yes Administer 1 drop into both eyes once daily. Historical Provider, MD Taking Active   fenofibrate micronized (Lofibra) 200 mg capsule [id number] Yes Take 1 capsule (200 mg) by mouth once daily with a meal. Historical Provider, MD Taking Active   ipratropium (Atrovent) 21 mcg (0.03 %) nasal spray [id number] Yes Administer 1 spray into each nostril 2 times a day. Historical Provider, MD Taking Active   latanoprost (Xalatan) 0.005 % ophthalmic solution [id number] Yes Administer 1 drop into both eyes once daily. Historical Provider, MD Taking Active   montelukast (Singulair) 10 mg tablet [id number] Yes TAKE 1 TABLET BY MOUTH DAILY AS DIRECTED Norberto Sparks DO Taking Active   multivitamin with iron (pediatric multivitamin-iron) tablet chewable split tablet [id number] Yes Take 1 half tablet by mouth once daily. Historical Provider, MD Taking Active   naproxen (Naprosyn) 500 mg tablet [id number] Yes Take 1 tablet (500 mg) by mouth 2 times a day with meals. Historical Provider, MD Taking Active   pantoprazole (ProtoNix) 40 mg EC tablet [id number] Yes Take 1 tablet (40 mg) by mouth once daily. Norberto Sparks DO Taking Active                   Coagulation Screen  Order: [id number]  Status: Final result       Visible to patient: Yes (not seen)    0 Result Notes      Component  Ref Range & Units 1 mo ago   Protime  9.8 - 12.8 sec 12.0   Comment: Note new reference range as of 6/20/2023 at 10:00am.   INR  0.9 - 1.1 1.1   aPTT  27 - 38 sec 33   Comment: Note new reference range as of 6/20/2023 at 10:00am.   Resulting Agency [facility]              Specimen Collected: 09/27/23 12:59 Last Resulted: 09/27/23 13:33           CBC and Auto Differential  Order: [id number]  Status: Final result       Visible to patient: Yes (not seen)    0 Result Notes            Component  Ref Range & Units 1 mo  ago  (9/27/23) 11 mo ago  (11/16/22) 1 yr ago  (3/30/22) 1 yr ago  (11/2/21) 2 yr ago  (1/27/21) 2 yr ago  (11/23/20) 3 yr ago  (5/20/20)   WBC  4.4 - 11.3 x10E9/L 5.7 5.5 5.2 5.8 5.1 6.3 6.3   nRBC  0.0 - 0.0 /100 WBC 0.0      0.0   RBC  4.00 - 5.20 x10E12/L 4.47 4.55 4.35 4.30 4.32 4.35 4.47   Hemoglobin  12.0 - 16.0 g/dL 14.3 14.9 14.3 14.3 14.0 14.4 14.6   Hematocrit  36.0 - 46.0 % 44.2 45.4 44.6 43.8 43.6 44.8 44.9   MCV  80 - 100 fL 99 100 103 High  102 High  101 High  103 High  100   MCHC  32.0 - 36.0 g/dL 32.4 32.8 32.1 32.6 32.1 32.1 32.5   Platelets  150 - 450 x10E9/L 257 273 258 265 275 254 259   RDW  11.5 - 14.5 % 13.8 13.7 14.2 13.4 13.4 13.6 14.6 High    Neutrophils %  40.0 - 80.0 % 70.0 67.9 61.6  64.8 69.2 73.0   Immature Granulocytes %, Automated  0.0 - 0.9 % 0.2 0.4 CM 0.2 CM  0.2 CM 0.3 CM 0.2 CM        Contains abnormal data Basic Metabolic Panel  Order: [id number]  Status: Final result       Visible to patient: Yes (not seen)    0 Result Notes              Component  Ref Range & Units 1 mo ago  (9/27/23) 11 mo ago  (11/16/22) 1 yr ago  (11/2/21) 2 yr ago  (1/6/21) 2 yr ago  (11/23/20) 3 yr ago  (8/20/20) 3 yr ago  (8/20/20)   Glucose  74 - 99 mg/dL 99 90 90 65 Low  89     Sodium  136 - 145 mmol/L 136 137 136 142 140     Potassium  3.5 - 5.3 mmol/L 4.4 4.7 4.9 4.3 4.7     Chloride  98 - 107 mmol/L 100 99 100 104 103     Bicarbonate  21 - 32 mmol/L 31 32 30 31 27     Anion Gap  10 - 20 mmol/L 9 Low  11 11 11 15     Urea Nitrogen  6 - 23 mg/dL 26 High  31 High  30 High  18 27 High   20   Creatinine  0.50 - 1.05 mg/dL 1.05 1.05 1.02 0.94 1.08 High  0.97    GFR Female  >90 mL/min/1.73m2 54 Abnormal  54 Abnormal  CM                Assessment/Plan   Susu is a 77-year-old female with past medical history of intermittent asthma who presented with mild bronchiectasis with waxing and waning centrilobular nodular infiltrates on CT scan over the past 2 years.      Previous CT scan of chest is reviewed  today. Agree with radiology report that chronic respiratory infection is possible such as non-TB mycobacterial infection. The patient denies recurrent fevers or night sweats. Weight has been stable. No interval history of respiratory infections.     Plan:  Bronchoscopy with BAL and ? TBBx.         Please excuse any misspellings or unintended errors related to the Dragon speech recognition software used to dictate this note.     Jennifer Sosa MD

## 2023-11-03 LAB
ADENOVIRUS QPCR, VIRC: NOT DETECTED COPIES/ML
ASPERGILLUS GALACTOMANNAN EIA (NON-BLOOD SPECIMEN): 0.12
CHLAMYDIA.PNEUMONIAE PCR, VIRC: NOT DETECTED
CYTOMEGALOVIRUS DNA PCR, (NON-BLOOD SPECI: NOT DETECTED IU/ML
HUMAN HERPESVIRUS-6 DNA PCR, QUANTITATIVE (NON-BLOOD SPECIMEN): NOT DETECTED COPIES/ML
LABORATORY COMMENT REPORT: NORMAL
LABORATORY COMMENT REPORT: NORMAL
LEGIONELLA PNEUMO PCR, VIRC: NOT DETECTED
MYCOPLASMA.PNEUMONIAE PCR, VIRC: NOT DETECTED
PAN.LEGIONELLA PCR, VIRC: NOT DETECTED
PATH REPORT.FINAL DX SPEC: NORMAL
PATH REPORT.GROSS SPEC: NORMAL
PATH REPORT.RELEVANT HX SPEC: NORMAL
PATH REPORT.TOTAL CANCER: NORMAL
PNEUMOCYSTIS PCR,QUANTITATIVE (NON-BLOOD SPECIMEN): NOT DETECTED COPIES/ML

## 2023-11-04 LAB
BACTERIA SPEC RESP CULT: ABNORMAL
BACTERIA SPEC RESP CULT: ABNORMAL
GRAM STN SPEC: ABNORMAL
GRAM STN SPEC: ABNORMAL

## 2023-11-05 LAB
BACTERIA SPEC RESP CULT: ABNORMAL
GRAM STN SPEC: ABNORMAL
GRAM STN SPEC: ABNORMAL

## 2023-11-06 DIAGNOSIS — J15.1: Primary | ICD-10-CM

## 2023-11-06 LAB
ADENOVIRUS RVP, VIRC: NOT DETECTED
ENTEROVIRUS/RHINOVIRUS RVP, VIRC: NOT DETECTED
HUMAN BOCAVIRUS RVP, VIRC: NOT DETECTED
HUMAN CORONAVIRUS RVP, VIRC: NOT DETECTED
INFLUENZA A , VIRC: NOT DETECTED
INFLUENZA A H1N1-09 , VIRC: NOT DETECTED
INFLUENZA B PCR, VIRC: NOT DETECTED
METAPNEUMOVIRUS , VIRC: NOT DETECTED
PARAINFLUENZA PCR, VIRC: NOT DETECTED
RSV PCR, RVP, VIRC: NOT DETECTED

## 2023-11-06 RX ORDER — LEVOFLOXACIN 500 MG/1
500 TABLET, FILM COATED ORAL DAILY
Qty: 10 TABLET | Refills: 0 | Status: SHIPPED | OUTPATIENT
Start: 2023-11-06 | End: 2023-11-16

## 2023-11-06 NOTE — PROGRESS NOTES
Bronchoscopy results are discussed with Susu over the phone today. BAL culture is positive for Pseudomonas and E-coli. Given the thick secretions seen during bronchoscopy, we discussed to start on a course of Levofloxacin x 10 days. We will continue to watch AFB culture (rule out NTM infection). Follow up in 3-4 weeks. We will need to repeat CT scan of chest in 6-8 weeks from most recent scan to assure stability / improvement in the right lung nodules. Susu verbalized understanding of plan.

## 2023-11-07 LAB
FUNGUS SPEC CULT: NORMAL
FUNGUS SPEC CULT: NORMAL
FUNGUS SPEC FUNGUS STN: NORMAL
FUNGUS SPEC FUNGUS STN: NORMAL

## 2023-11-08 ENCOUNTER — TELEPHONE (OUTPATIENT)
Dept: PULMONOLOGY | Facility: CLINIC | Age: 78
End: 2023-11-08
Payer: MEDICARE

## 2023-11-16 NOTE — RESULT ENCOUNTER NOTE
Bala Vargas,   As expected, BAL is positive for AFB. Waiting for culture result (likely MAC infection). She is scheduled to see you in 2 weeks. Would consider repeating CT scan of chest to re-evaluate lung nodules and Refer her to ID clinic.

## 2023-11-17 ENCOUNTER — LAB (OUTPATIENT)
Dept: LAB | Facility: LAB | Age: 78
End: 2023-11-17
Payer: MEDICARE

## 2023-11-17 ENCOUNTER — OFFICE VISIT (OUTPATIENT)
Dept: PRIMARY CARE | Facility: CLINIC | Age: 78
End: 2023-11-17
Payer: MEDICARE

## 2023-11-17 VITALS
DIASTOLIC BLOOD PRESSURE: 88 MMHG | SYSTOLIC BLOOD PRESSURE: 148 MMHG | RESPIRATION RATE: 16 BRPM | WEIGHT: 113 LBS | BODY MASS INDEX: 20.02 KG/M2 | TEMPERATURE: 97.6 F

## 2023-11-17 DIAGNOSIS — E04.1 THYROID NODULE: Primary | ICD-10-CM

## 2023-11-17 DIAGNOSIS — E78.2 MIXED HYPERLIPIDEMIA: ICD-10-CM

## 2023-11-17 LAB
ALBUMIN SERPL BCP-MCNC: 3.8 G/DL (ref 3.4–5)
ALP SERPL-CCNC: 75 U/L (ref 33–136)
ALT SERPL W P-5'-P-CCNC: 12 U/L (ref 7–45)
ANION GAP SERPL CALC-SCNC: 12 MMOL/L (ref 10–20)
AST SERPL W P-5'-P-CCNC: 27 U/L (ref 9–39)
BASOPHILS # BLD AUTO: 0.03 X10*3/UL (ref 0–0.1)
BASOPHILS NFR BLD AUTO: 0.4 %
BILIRUB SERPL-MCNC: 1 MG/DL (ref 0–1.2)
BUN SERPL-MCNC: 22 MG/DL (ref 6–23)
CALCIUM SERPL-MCNC: 9.5 MG/DL (ref 8.6–10.3)
CHLORIDE SERPL-SCNC: 100 MMOL/L (ref 98–107)
CHOLEST SERPL-MCNC: 100 MG/DL (ref 0–199)
CHOLESTEROL/HDL RATIO: 2.6
CO2 SERPL-SCNC: 31 MMOL/L (ref 21–32)
CREAT SERPL-MCNC: 0.91 MG/DL (ref 0.5–1.05)
EOSINOPHIL # BLD AUTO: 0.12 X10*3/UL (ref 0–0.4)
EOSINOPHIL NFR BLD AUTO: 1.6 %
ERYTHROCYTE [DISTWIDTH] IN BLOOD BY AUTOMATED COUNT: 13.5 % (ref 11.5–14.5)
GFR SERPL CREATININE-BSD FRML MDRD: 64 ML/MIN/1.73M*2
GLUCOSE SERPL-MCNC: 89 MG/DL (ref 74–99)
HCT VFR BLD AUTO: 43.2 % (ref 36–46)
HDLC SERPL-MCNC: 38.7 MG/DL
HGB BLD-MCNC: 14.4 G/DL (ref 12–16)
IMM GRANULOCYTES # BLD AUTO: 0.03 X10*3/UL (ref 0–0.5)
IMM GRANULOCYTES NFR BLD AUTO: 0.4 % (ref 0–0.9)
LDLC SERPL CALC-MCNC: 42 MG/DL
LYMPHOCYTES # BLD AUTO: 0.79 X10*3/UL (ref 0.8–3)
LYMPHOCYTES NFR BLD AUTO: 10.8 %
MCH RBC QN AUTO: 32.7 PG (ref 26–34)
MCHC RBC AUTO-ENTMCNC: 33.3 G/DL (ref 32–36)
MCV RBC AUTO: 98 FL (ref 80–100)
MONOCYTES # BLD AUTO: 0.71 X10*3/UL (ref 0.05–0.8)
MONOCYTES NFR BLD AUTO: 9.7 %
NEUTROPHILS # BLD AUTO: 5.64 X10*3/UL (ref 1.6–5.5)
NEUTROPHILS NFR BLD AUTO: 77.1 %
NON HDL CHOLESTEROL: 61 MG/DL (ref 0–149)
NRBC BLD-RTO: 0 /100 WBCS (ref 0–0)
PLATELET # BLD AUTO: 264 X10*3/UL (ref 150–450)
POTASSIUM SERPL-SCNC: 4.6 MMOL/L (ref 3.5–5.3)
PROT SERPL-MCNC: 6.3 G/DL (ref 6.4–8.2)
RBC # BLD AUTO: 4.4 X10*6/UL (ref 4–5.2)
SODIUM SERPL-SCNC: 138 MMOL/L (ref 136–145)
TRIGL SERPL-MCNC: 95 MG/DL (ref 0–149)
TSH SERPL-ACNC: 1.46 MIU/L (ref 0.44–3.98)
VLDL: 19 MG/DL (ref 0–40)
WBC # BLD AUTO: 7.3 X10*3/UL (ref 4.4–11.3)

## 2023-11-17 PROCEDURE — 84443 ASSAY THYROID STIM HORMONE: CPT

## 2023-11-17 PROCEDURE — 1160F RVW MEDS BY RX/DR IN RCRD: CPT | Performed by: INTERNAL MEDICINE

## 2023-11-17 PROCEDURE — 85025 COMPLETE CBC W/AUTO DIFF WBC: CPT

## 2023-11-17 PROCEDURE — 36415 COLL VENOUS BLD VENIPUNCTURE: CPT

## 2023-11-17 PROCEDURE — 1159F MED LIST DOCD IN RCRD: CPT | Performed by: INTERNAL MEDICINE

## 2023-11-17 PROCEDURE — 1036F TOBACCO NON-USER: CPT | Performed by: INTERNAL MEDICINE

## 2023-11-17 PROCEDURE — 80061 LIPID PANEL: CPT

## 2023-11-17 PROCEDURE — 99214 OFFICE O/P EST MOD 30 MIN: CPT | Performed by: INTERNAL MEDICINE

## 2023-11-17 PROCEDURE — 80053 COMPREHEN METABOLIC PANEL: CPT

## 2023-11-17 PROCEDURE — 1126F AMNT PAIN NOTED NONE PRSNT: CPT | Performed by: INTERNAL MEDICINE

## 2023-11-17 ASSESSMENT — ENCOUNTER SYMPTOMS
APPETITE CHANGE: 0
UNEXPECTED WEIGHT CHANGE: 1
CONSTIPATION: 0
SHORTNESS OF BREATH: 0
TROUBLE SWALLOWING: 0
DIARRHEA: 0

## 2023-11-17 NOTE — PROGRESS NOTES
Patient here for a 6 month follow up    Subjective   Patient ID: Susu Lackey is a 78 y.o. female who presents for Follow-up.    The patient recently completed a bronchoscopy with BAL on 11/1/2023 which was positive for AFB likely due to MAC Infection.  Since the procedure, the patient reports that the dyspnea has subsided.  She was started on levofloxacin that had to be discontinued due to lower limb edema.  She is currently following with Dr.Haj Sosa from Pulmonary Medicine, and is scheduled for an upcoming appointment with Pulmonary Medicine per the note.    The patient's weight has stabilized at around 113 lbs today, and she endorses good appetite.  The patient denies any dysphagia or neck masses, and had her last endoscopy in 6/2020.  She continues to take pantoprazole 40 mg once daily.      The patient reports cold hands and feet, particularly when barefoot in the winter time.     The patient is taking Metamucil regularly, and denies any bowel problems.       Review of Systems   Constitutional:  Positive for unexpected weight change. Negative for appetite change.   HENT:  Negative for trouble swallowing.    Respiratory:  Negative for shortness of breath.    Gastrointestinal:  Negative for constipation and diarrhea.   Musculoskeletal:         Positive for cold hands and feet.      Objective   Physical Exam  Constitutional:       Appearance: Normal appearance.   Neck:      Vascular: No carotid bruit.   Cardiovascular:      Rate and Rhythm: Normal rate and regular rhythm.      Heart sounds: Normal heart sounds.   Pulmonary:      Effort: Pulmonary effort is normal.      Breath sounds: Normal breath sounds.   Abdominal:      General: Bowel sounds are normal.      Palpations: Abdomen is soft.      Tenderness: There is no abdominal tenderness.   Skin:     General: Skin is warm and dry.   Neurological:      General: No focal deficit present.      Mental Status: She is alert and oriented to person, place, and  time. Mental status is at baseline.   Psychiatric:         Mood and Affect: Mood normal.         Behavior: Behavior normal.       Assessment/Plan   Problem List Items Addressed This Visit             ICD-10-CM    Hyperlipidemia E78.5    Relevant Orders    Lipid panel    Comprehensive metabolic panel    Tsh With Reflex To Free T4 If Abnormal    CBC and Auto Differential     Other Visit Diagnoses         Codes    Thyroid nodule    -  Primary E04.1    Relevant Orders    US thyroid            IMPRESSION/PLAN :      Thyroid Nodule   - Thyroid US 12/2020 showed benign-appearing spongiform nodules and cysts <1cm. Last TSH wnl 11/2022.  Ordered TSH and repeat Thyroid Ultrasound.     /88 in the office today.     HLD   - Stable, maintained on fenofibrate 200mg QD.     Lung Nodules /MAC Infection  - s/p bronchoscopy with BAL 11/1/2023 positive for AFB likely due to MAC Infection.  Waxing and waning areas of pulmonary nodularity with diffuse bronchial thickening which have an inflammatory appearance. There is however a more irregular appearing right lower lobe nodule for which further evaluation is advised. This has a bilobed component and does appear to be somewhat more solid. Neoplasm needs to be excluded. Following with Dr.Haj Sosa from Pulmonology.  Referred to ID per note.  Call the clinic with any questions or concerns.       Reflux   - Symptoms managed with pantoprazole 40mg QD.  Last EGD 6/2020.     Weight Loss  - Advised patient to discuss with  from Pulmonology during next visit as this may be related to lung nodules/bronchiectasis.  Also advised patient to restart taking Boost Nutrition supplements as well.     Lower Limb Paresthesia  - Distal pulses intact.  Likely due to nerve impingement from OA.  Call the clinic if symptoms persist or worsen and will consider ordering Nerve conduction tests and EMG.    OA   - Takes naproxen 500mg BID as needed for pain with food and water. Advised that  she can replace one of the daily doses with Tylenol to prevent adverse renal effects.     Asthma   - Maintained on Atrovent HFA 21mcg/act 1-2 puffs BID, and Montelukast 10 mg QD. Following with Dr.Haj Sosa from Pulmonology.     Pelvic Congestion Syndrome   - Followed with Dr. Del Real in GYN.     Raynaud's  - Continue to monitor symptoms. If worsening, consider PVR/EFRAIN.    Sinus Congestion   - Flonase is contraindicated due to glaucoma. Use nasal cleanser before using nasal spray at bed time.      Sleep Disturbance   - I have advised the patient start with Melatonin at 5 mg QHS and increase up to a maximum of 10 mg.     Health Maintenance   - Routine labs ordered including CBC, CMP, and a lipid panel to be completed in the fasting state.  Last Mammogram 7/2023. Cologuard negative 8/2021, due for repeat 2024.  Advised the patient to receive Shingrix series and Prevnar-20 immunizations separately through the pharmacy, and discussed adverse effects.      Follow up in 6 months, call sooner if needed.        Scribe Attestation  By signing my name below, I, Shashi Presley   attest that this documentation has been prepared under the direction and in the presence of Norberto Sparks DO.

## 2023-11-27 ENCOUNTER — ANCILLARY PROCEDURE (OUTPATIENT)
Dept: RADIOLOGY | Facility: CLINIC | Age: 78
End: 2023-11-27
Payer: MEDICARE

## 2023-11-27 DIAGNOSIS — E04.1 THYROID NODULE: ICD-10-CM

## 2023-11-27 PROCEDURE — 76536 US EXAM OF HEAD AND NECK: CPT | Performed by: RADIOLOGY

## 2023-11-27 PROCEDURE — 76536 US EXAM OF HEAD AND NECK: CPT

## 2023-11-27 NOTE — PROGRESS NOTES
Patient: Susu Lackey    [id number]  : 1945 -- AGE 78 y.o.    Provider: Alicia Linares CNP     Location [facility]   Service Date: 2023       Department of Medicine  Division of Pulmonary, Critical Care, and Sleep Medicine         [facility]  Follow Up Visit Note    HISTORY OF PRESENT ILLNESS     HISTORY OF PRESENT ILLNESS   Susu Lackey is a 78 y.o. female who presents to a [facility] for a follow up visit with concerns of MAC. . I have independently interviewed and examined the patient in the office and reviewed available records.    DATE OF LAST VISIT: 23-      Bronchoscope     History Of Present Illness  Mrs. Lackey is a 77-year-old female with past medical history significant for intermittent asthma who presented to the office today regarding multiple pulmonary nodules noted on a CT scan of chest.      The patient has been following with primary care physician and GI clinics for unintentional weight loss over the past 2 years. During work-up CT scan of chest revealed waxing and waning centrilobular nodular infiltrates that was concerning for possible chronic respiratory infection. The patient is a lifetime non-smoker. She denies high risk occupational exposures, was a  until she retired. She is independent in her daily activities and denies limiting shortness of breath. She reports occasional cough and chest congestion. She denies recurrent fevers or night sweats. She was started on boost cans by GI and since then she reports she has been gaining weight back again. Most recent CT scan of chest in 2020 is reviewed today which revealed again waxing and waning centrilobular nodular infiltrate.     No past history of connective tissue disease  No family history of chronic lung disease reported.     Follow up 2021:  No acute respiratory symptoms today. She did not have  productive cough to submit sputum after last visit. She denies recurrent fevers or night sweats. She has gained about 6 pounds. No acute chest pain or hemoptysis. She reports occasional wheezing with weather changes from asthma. She has not been using any bronchodilators. She has history of glaucoma for which she is following up with ophthalmology on regular basis. She reports arthritis in her hands and spine that is attributed to osteoarthritis in the past.     Follow-up 7/29/21:  No acute respiratory symptoms again today. Susu reports occasional cough that is mostly dry. She did not submit any sputum for cultures. She denies recurrent fevers or night sweats. Her weight has been stable. Using albuterol only as needed. Repeat CT scan of chest revealed wax and wane pulmonary nodules. Results are discussed with patient in details today.     Follow up 3/15/2022:  Susu stable respiratory status since last visit. Weight has been stable. No recurrent fevers or night sweats. She has occasional cough mainly in the afternoon with postnasal drainage. No purulent sputum production or hemoptysis. She is up-to-date with vaccinations.     Follow-up 4/21/2022:  (phone visit)  Susu reports a stable respiratory status. No acute fevers, recurrent chills or night sweats. Weight has been stable. Cough is mostly dry and is not persistent. Repeated CT scan of chest is reviewed today which revealed overall stability in the centrilobular nodularity and mild bronchiectasis. We discussed today that the stability of CT scan over the past 2-3 years is reassuring. Etiology could be related to chronic respiratory infection though she does not have any signs/symptoms of active infection right now. Immunoglobulin levels are within normal range. We discussed today that bronchoscopy with bronchoalveolar lavage would be the next step to further evaluate for chronic infection though given that her symptoms are stable now we could continue  observation as well. Bronchoscopy is deferred and we will follow-up with her in the next 4-6 months or sooner if necessary. She is informed to call my office should she develop new respiratory symptoms.     Please excuse any misspellings or unintended errors related to the Dragon speech recognition software used to dictate this note.      Follow-up 10/21/2022:  No acute respiratory symptoms today or interval history of recurrent fevers, chills or night sweats. No weight loss. Denies any hemoptysis. She reports intermittent cough that is minimally productive. No change in respiratory status over the past year.     Follow-up 2/14/2023:  Susu reports stable respiratory status since last visit. Most recent CT scan of chest is reviewed today which revealed stable pulmonary nodules when compared to his previous scan. She denies recurrent fevers, persistent cough or sputum production. No night sweats. No change in her weight over the past year. She is able to conduct her daily activities without respiratory limitation.      Follow up 11/1/2023:  Most recent CT scan of chest showed waxing and waning areas of pulmonary nodularity with diffuse bronchial thickening. Plan is for Bronchoscopy with BAL and possible Tbbx today.     Current Sfwrtcu37/7/2023    On today's visit, the patient reports feeling good until this past Sunday. Feeling worse in the afternoo. C/o feeling congestion in the chest towards evening.  C/o dry cough.  C/o AVINA after strenous exercise or walking fast. Denies fever /chills chest pain or GERD.  Denies night time cough. NO ER visits. Declines flu vaccine.  She is not using inhaler as she does not need        REVIEW OF SYSTEMS     REVIEW OF SYSTEMS  Review of Systems   Respiratory:  Positive for cough.    Cardiovascular:  Positive for leg swelling.   All other systems reviewed and are negative.        ALLERGIES AND MEDICATIONS     ALLERGIES  Allergies   Allergen Reactions    Dog Dander Other    Feathers  Other    House Dust Mite Other       MEDICATIONS  Current Outpatient Medications   Medication Sig Dispense Refill    Combigan 0.2-0.5 % ophthalmic solution Administer 1 drop into both eyes once daily.      fenofibrate micronized (Lofibra) 200 mg capsule Take 1 capsule (200 mg) by mouth once daily with a meal. 90 capsule 3    ipratropium (Atrovent) 21 mcg (0.03 %) nasal spray Administer 1 spray into each nostril every 12 hours.      latanoprost (Xalatan) 0.005 % ophthalmic solution Administer 1 drop into both eyes once daily.      montelukast (Singulair) 10 mg tablet TAKE 1 TABLET BY MOUTH DAILY AS DIRECTED 90 tablet 3    multivitamin with iron (pediatric multivitamin-iron) tablet chewable split tablet Take 1 half tablet by mouth once daily.      naproxen (Naprosyn) 500 mg tablet Take 1 tablet (500 mg) by mouth 2 times a day with meals. 60 tablet 1    pantoprazole (ProtoNix) 40 mg EC tablet Take 1 tablet (40 mg) by mouth once daily. 90 tablet 3     No current facility-administered medications for this visit.         PAST HISTORY     PAST MEDICAL HISTORY  She  has a past medical history of Abnormal findings on diagnostic imaging of other specified body structures (11/22/2017), Encounter for screening mammogram for malignant neoplasm of breast, Epigastric pain (10/08/2019), Gastro-esophageal reflux disease with esophagitis, without bleeding (12/13/2019), Nonscarring hair loss, unspecified (12/13/2019), Other acute recurrent sinusitis (11/07/2017), Other conditions influencing health status (12/13/2019), Other symptoms and signs involving general sensations and perceptions (09/28/2018), Personal history of other diseases of the nervous system and sense organs, Personal history of other diseases of the respiratory system (10/08/2014), Personal history of other diseases of the respiratory system (11/14/2018), Personal history of other diseases of the respiratory system (09/28/2018), Personal history of other diseases of  "the respiratory system, Personal history of other medical treatment (05/08/2017), Personal history of other medical treatment (05/15/2019), Personal history of other specified conditions (09/26/2018), Personal history of other specified conditions (05/24/2018), Personal history of other specified conditions (08/20/2020), and Vulvar cyst (07/17/2014).       PAST SURGICAL HISTORY  Past Surgical History:   Procedure Laterality Date    COLONOSCOPY  10/29/2012    Complete Colonoscopy    FOOT SURGERY  10/29/2012    Foot Surgery    OTHER SURGICAL HISTORY  01/11/2014    Vaginal Pap smear    TONSILLECTOMY  11/12/2012    Tonsillectomy       IMMUNIZATION HISTORY  Immunization History   Administered Date(s) Administered    Moderna COVID-19 vaccine, bivalent, blue cap/gray label *Check age/dose* 10/13/2022    Moderna SARS-CoV-2 Vaccination 03/02/2021, 03/30/2021, 12/27/2021       SOCIAL HISTORY  She  reports that she has never smoked. She has never used smokeless tobacco. She reports current alcohol use. She reports that she does not use drugs. She Patient     OCCUPATIONAL/ENVIRONMENTAL HISTORY  Previously worked as:    DOES/DOES NOT: does not have known exposure to asbestos, silica, beryllium or inhaled metals.  DOES/DOES NOT: does not have exposure to birds or exotic animals.    FAMILY HISTORY  Family History   Problem Relation Name Age of Onset    Hemolytic uremic syndrome Father      Breast cancer Sister       DOES/DOES NOT: does not have a family history of pulmonary disease.  DOES/DOES NOT: does have a family history of cancer.  DOES/DOES NOT: does not have a family history of autoimmune disorders.    PHYSICAL EXAM     VITAL SIGNS: There were no vitals taken for this visit. /86 (BP Location: Left arm, Patient Position: Sitting, BP Cuff Size: Adult)   Pulse 81   Temp 34.5 °C (94.1 °F) (Temporal)   Resp 16   Ht 1.6 m (5' 3\")   Wt 51.4 kg (113 lb 6.4 oz)   SpO2 (!) 89%   BMI 20.09 " kg/m²      PREVIOUS WEIGHTS:  Wt Readings from Last 3 Encounters:   11/17/23 51.3 kg (113 lb)   11/01/23 49.9 kg (110 lb)   05/17/23 52.2 kg (115 lb)       Physical Exam  Constitutional:       Appearance: Normal appearance.   HENT:      Head: Normocephalic and atraumatic.      Nose: Nose normal.      Mouth/Throat:      Mouth: Mucous membranes are moist.      Pharynx: Oropharynx is clear.   Eyes:      Extraocular Movements: Extraocular movements intact.      Pupils: Pupils are equal, round, and reactive to light.   Cardiovascular:      Rate and Rhythm: Normal rate and regular rhythm.      Pulses: Normal pulses.   Pulmonary:      Effort: Pulmonary effort is normal.      Breath sounds: Normal breath sounds.   Abdominal:      General: Bowel sounds are normal.      Palpations: Abdomen is soft.   Musculoskeletal:         General: Normal range of motion.   Skin:     General: Skin is warm and dry.   Neurological:      General: No focal deficit present.      Mental Status: She is alert and oriented to person, place, and time. Mental status is at baseline.   Psychiatric:         Mood and Affect: Mood normal.         Behavior: Behavior normal.         Thought Content: Thought content normal.         Judgment: Judgment normal.           RESULTS/DATA     Pulmonary Function Test Results   12/13/2022        Chest Radiograph     No results found      Chest CT Scan     Done on: 9/5/23  IMPRESSION:  1.  Waxing and waning areas of pulmonary nodularity with diffuse  bronchial thickening which have an inflammatory appearance. There is  however a more irregular appearing right lower lobe nodule for which  further evaluation is advised. This has a bilobed component and does  appear to be somewhat more solid. Neoplasm needs to be excluded.  Further evaluation is advised with PET-CT and close follow-up.  Senescent changes      Echocardiogram     5/26/22: Echo strip       Labwork   Complete Blood Count  Lab Results   Component Value Date     WBC 7.3 11/17/2023    HGB 14.4 11/17/2023    HCT 43.2 11/17/2023    MCV 98 11/17/2023     11/17/2023       Peripheral Eosinophil Count/Percentage:   Eosinophils Absolute (x10*3/uL)   Date Value   11/17/2023 0.12     Eosinophils % (%)   Date Value   11/17/2023 1.6       Serum Immunoglobulin E:    IgE (IU/mL)   Date Value   09/27/2023 7          ASSESSMENT/PLAN     Ms. Lackey is a 78 y.o. female and  has a past medical history of Abnormal findings on diagnostic imaging of other specified body structures (11/22/2017), Encounter for screening mammogram for malignant neoplasm of breast, Epigastric pain (10/08/2019), Gastro-esophageal reflux disease with esophagitis, without bleeding (12/13/2019), Nonscarring hair loss, unspecified (12/13/2019), Other acute recurrent sinusitis (11/07/2017), Other conditions influencing health status (12/13/2019), Other symptoms and signs involving general sensations and perceptions (09/28/2018), Personal history of other diseases of the nervous system and sense organs, Personal history of other diseases of the respiratory system (10/08/2014), Personal history of other diseases of the respiratory system (11/14/2018), Personal history of other diseases of the respiratory system (09/28/2018), Personal history of other diseases of the respiratory system, Personal history of other medical treatment (05/08/2017), Personal history of other medical treatment (05/15/2019), Personal history of other specified conditions (09/26/2018), Personal history of other specified conditions (05/24/2018), Personal history of other specified conditions (08/20/2020), and Vulvar cyst (07/17/2014). She presents to the [facility] for follow up of cough and MAC infection     Problem List and Orders      Assessment and Plan / Recommendations:    S/p Bronch: BAL +   Pseudomonas aeruginosa and e coli, + acid fast bacilli + Mycobacterium avium   Fungal culture negative -  "  Cytology - No malignant cells identified                GMS stain is negative for organisms: fungal and pneumocystis organisms.                Significant acute inflammation    Repeat Ct chest  Refer to ID   Will add breo ellipta 2 puffs once a day, rinse after using - due to moderate obstruction and air trapping. 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 Mycobacteria are germs that live in the environment, particularly on wet or moist surfaces. This is why mycobacteria like MAC can be found in water faucets, shower heads, and soil. Because mycobacteria are found in so many places it is nearly impossible to avoid. Changing the shower head in your bathroom every 6 to 12 months might reduce your exposure to mycobacteria, but this is only hypothetically helpful. You cannot spread mycobacterial infections to others, so you do not need to worry about \"being contagious.\" We cannot totally explain why some people like yourself get mycobacteria infection in the lungs, but it is probably a combination of the lungs being a favorable or permissive environment for infection, genetic, and immune system factors. As we discussed in clinic, these infections are difficult to treat because they require multiple antibiotics and treatment durations lasting months. Treating the infection for 12+ months AFTER the FIRST negative sputum culture is important to reduce the risk of recurrent infection. Some data suggest that the difference in success rates between 12+ months of treatment and shorter lengths of treatment is on the order of ~80% versus ~20%. A good resource for more information about mycobacteria infection of the lungs is the web site: https://www.ntmfacts.com     Before starting treatment, we need to check several tests to make sure they are normal. If these tests are normal, it will allow me to safely prescribe the " regimen. These tests include:  1. Complete blood count (rifampin and rifabutin can cause low white blood count) - this was taken 11/17/2023  2. Liver function tests (azithromycin, clarithromycin, rifampin, and rifabutin can cause liver inflammation, a sign of toxicity) this was taken 11/17/2023  3. Electrocardiogram (azithromycin and clarithromycin can prolong the QT interval, which could trigger a life-threatening disturbance to heart rhythm) - this is needed   4. Eye examination (ethambutol can cause loss of color vision and/or decreased visual acuity due to irritation of the optic nerve) she is scheduled in March 2024  5. CT scan of the chest (we need to confirm the pattern of MAC infection because that will influence the intensity of the treatment regimen)  6. Sputum culture or bronchoscopy culture (to keep checking for growth of the MAC in the lungs) - completed 11/1/2023    Thank you for visiting the Pulmonary clinic today!       Return to clinic after _3 months and after PFTs, CT scan complete  or sooner if needed   Alicia Linares CNP  My office number is [phone] -     Best way to get a hold of me is to call my office --> Please do not send me follow my health messages  Any test results will be discussed at next visit -- please make sure to make a follow up appt after testing.

## 2023-12-06 ENCOUNTER — OFFICE VISIT (OUTPATIENT)
Dept: DERMATOLOGY | Facility: CLINIC | Age: 78
End: 2023-12-06
Payer: MEDICARE

## 2023-12-06 DIAGNOSIS — Z12.83 ENCOUNTER FOR SCREENING FOR MALIGNANT NEOPLASM OF SKIN: Primary | ICD-10-CM

## 2023-12-06 DIAGNOSIS — L82.1 SEBORRHEIC KERATOSIS: ICD-10-CM

## 2023-12-06 DIAGNOSIS — L57.0 ACTINIC KERATOSIS: ICD-10-CM

## 2023-12-06 DIAGNOSIS — D22.9 MELANOCYTIC NEVUS, UNSPECIFIED LOCATION: ICD-10-CM

## 2023-12-06 DIAGNOSIS — D18.01 HEMANGIOMA OF SKIN: ICD-10-CM

## 2023-12-06 DIAGNOSIS — L81.4 LENTIGO: ICD-10-CM

## 2023-12-06 PROCEDURE — 99213 OFFICE O/P EST LOW 20 MIN: CPT | Performed by: DERMATOLOGY

## 2023-12-06 PROCEDURE — 1160F RVW MEDS BY RX/DR IN RCRD: CPT | Performed by: DERMATOLOGY

## 2023-12-06 PROCEDURE — 1036F TOBACCO NON-USER: CPT | Performed by: DERMATOLOGY

## 2023-12-06 PROCEDURE — 1159F MED LIST DOCD IN RCRD: CPT | Performed by: DERMATOLOGY

## 2023-12-06 PROCEDURE — 17000 DESTRUCT PREMALG LESION: CPT | Performed by: DERMATOLOGY

## 2023-12-06 PROCEDURE — 1126F AMNT PAIN NOTED NONE PRSNT: CPT | Performed by: DERMATOLOGY

## 2023-12-06 NOTE — PROGRESS NOTES
Subjective   Susu Lackey is a 78 y.o. female who presents for the following: Skin Check.  Skin Cancer Screening  She has a history of heavy sun exposure. She is in the sun rarely. She uses sunscreen infrequently. She reports no skin symptoms. Her moles are not changing.    Spots that concern her: none.          Objective   Well appearing patient in no apparent distress; mood and affect are within normal limits.    A full examination was performed including scalp, head, eyes, ears, nose, lips, neck, chest, axillae, abdomen, back, buttocks, bilateral upper extremities, bilateral lower extremities, hands, feet, fingers, toes, fingernails, and toenails. All findings within normal limits unless otherwise noted below.    Left Eyebrow  Destruction of Actinic Keratosis Procedure Note  Diagnosis: AK  Location:  Left eyebrow  Informed consent: Discussed risks (permanent scarring, light or dark discoloration, infection, pain, bleeding, bruising, redness, blister formation, and recurrence of the lesion) and benefits of the procedure, as well as the alternatives.  Informed consent was obtained.  Anesthesia: not required  Procedure Details:  The lesion was destroyed with liquid nitrogen. The patient tolerated procedure well.  Total number of lesions treated:  1  Plan:  The patient was instructed on post-op care. Recommend OTC analgesia as needed for pain.      All nevi were symmetric brown macules without atypia on dermoscopy.     Scattered tan macules in sun-exposed areas.    Stuck on verrucous, tan-brown papules and plaques.      Scattered cherry-red papule(s).      Assessment/Plan   Actinic keratosis  Left Eyebrow    Destr of lesion - Left Eyebrow  Complexity: simple    Destruction method: cryotherapy    Informed consent: discussed and consent obtained    Lesion destroyed using liquid nitrogen: Yes    Cryotherapy cycles:  1  Outcome: patient tolerated procedure well with no complications    Post-procedure details: wound  care instructions given      Melanocytic nevus, unspecified location    The ABCDEs of melanoma and warning signs of non-melanoma skin cancer were discussed with patient and patient expressed understanding. Sun protection and use of at least SPF 30 discussed with patient. Pt instructed to reapply every 2 hours.     Lentigo    The ABCDEs of melanoma and warning signs of non-melanoma skin cancer were discussed with patient and patient expressed understanding. Sun protection and use of at least SPF 30 discussed with patient. Pt instructed to reapply every 2 hours.     Seborrheic keratosis    Hemangioma of skin      Follow up in 1 year.    Scribe Attestation  By signing my name below, IMagy Scribe   attest that this documentation has been prepared under the direction and in the presence of Michaela Melendez MD.

## 2023-12-07 ENCOUNTER — OFFICE VISIT (OUTPATIENT)
Dept: PULMONOLOGY | Facility: CLINIC | Age: 78
End: 2023-12-07
Payer: MEDICARE

## 2023-12-07 VITALS
DIASTOLIC BLOOD PRESSURE: 86 MMHG | TEMPERATURE: 94.1 F | WEIGHT: 113.4 LBS | OXYGEN SATURATION: 89 % | RESPIRATION RATE: 16 BRPM | HEART RATE: 81 BPM | HEIGHT: 63 IN | SYSTOLIC BLOOD PRESSURE: 137 MMHG | BODY MASS INDEX: 20.09 KG/M2

## 2023-12-07 DIAGNOSIS — R05.3 CHRONIC COUGH: ICD-10-CM

## 2023-12-07 DIAGNOSIS — A31.0 MYCOBACTERIUM AVIUM COMPLEX (MULTI): Primary | ICD-10-CM

## 2023-12-07 LAB
ACID FAST STN SPEC: ABNORMAL
ACID FAST STN SPEC: ABNORMAL
MYCOBACTERIUM SPEC CULT: ABNORMAL
MYCOBACTERIUM SPEC CULT: ABNORMAL

## 2023-12-07 PROCEDURE — 1160F RVW MEDS BY RX/DR IN RCRD: CPT | Performed by: NURSE PRACTITIONER

## 2023-12-07 PROCEDURE — 1159F MED LIST DOCD IN RCRD: CPT | Performed by: NURSE PRACTITIONER

## 2023-12-07 PROCEDURE — 99214 OFFICE O/P EST MOD 30 MIN: CPT | Performed by: NURSE PRACTITIONER

## 2023-12-07 PROCEDURE — 1126F AMNT PAIN NOTED NONE PRSNT: CPT | Performed by: NURSE PRACTITIONER

## 2023-12-07 PROCEDURE — 1036F TOBACCO NON-USER: CPT | Performed by: NURSE PRACTITIONER

## 2023-12-07 RX ORDER — FLUTICASONE FUROATE AND VILANTEROL 100; 25 UG/1; UG/1
1 POWDER RESPIRATORY (INHALATION) DAILY
Qty: 28 EACH | Refills: 3 | Status: SHIPPED | OUTPATIENT
Start: 2023-12-07 | End: 2024-05-23 | Stop reason: ALTCHOICE

## 2023-12-07 ASSESSMENT — COLUMBIA-SUICIDE SEVERITY RATING SCALE - C-SSRS
6. HAVE YOU EVER DONE ANYTHING, STARTED TO DO ANYTHING, OR PREPARED TO DO ANYTHING TO END YOUR LIFE?: NO
1. IN THE PAST MONTH, HAVE YOU WISHED YOU WERE DEAD OR WISHED YOU COULD GO TO SLEEP AND NOT WAKE UP?: NO
2. HAVE YOU ACTUALLY HAD ANY THOUGHTS OF KILLING YOURSELF?: NO

## 2023-12-07 ASSESSMENT — ENCOUNTER SYMPTOMS
OCCASIONAL FEELINGS OF UNSTEADINESS: 0
LOSS OF SENSATION IN FEET: 0
COUGH: 1
DEPRESSION: 0

## 2023-12-07 ASSESSMENT — PAIN SCALES - GENERAL: PAINLEVEL: 0-NO PAIN

## 2023-12-07 ASSESSMENT — ASTHMA QUESTIONNAIRES
QUESTION_3 LAST FOUR WEEKS HOW OFTEN DID YOUR ASTHMA SYMPTOMS (WHEEZING, COUGHING, SHORTNESS OF BREATH, CHEST TIGHTNESS OR PAIN) WAKE YOU UP AT NIGHT OR EARLIER THAN USUAL IN THE MORNING: NOT AT ALL
QUESTION_2 LAST FOUR WEEKS HOW OFTEN HAVE YOU HAD SHORTNESS OF BREATH: NOT AT ALL
QUESTION_5 LAST FOUR WEEKS HOW WOULD YOU RATE YOUR ASTHMA CONTROL: COMPLETELY CONTROLLED
QUESTION_1 LAST FOUR WEEKS HOW MUCH OF THE TIME DID YOUR ASTHMA KEEP YOU FROM GETTING AS MUCH DONE AT WORK, SCHOOL OR AT HOME: NONE OF THE TIME
ACT_TOTALSCORE: 25
QUESTION_4 LAST FOUR WEEKS HOW OFTEN HAVE YOU USED YOUR RESCUE INHALER OR NEBULIZER MEDICATION (SUCH AS ALBUTEROL): NOT AT ALL

## 2023-12-07 ASSESSMENT — PATIENT HEALTH QUESTIONNAIRE - PHQ9
2. FEELING DOWN, DEPRESSED OR HOPELESS: NOT AT ALL
SUM OF ALL RESPONSES TO PHQ9 QUESTIONS 1 AND 2: 0
1. LITTLE INTEREST OR PLEASURE IN DOING THINGS: NOT AT ALL

## 2023-12-07 NOTE — PATIENT INSTRUCTIONS
"S/p Bronch: BAL +   Pseudomonas aeruginosa and e coli, + acid fast bacilli + Mycobacterium avium   Fungal culture negative -   Cytology - No malignant cells identified                GMS stain is negative for organisms: fungal and pneumocystis organisms.                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 Mycobacteria are germs that live in the environment, particularly on wet or moist surfaces. This is why mycobacteria like MAC can be found in water faucets, shower heads, and soil. Because mycobacteria are found in so many places it is nearly impossible to avoid. Changing the shower head in your bathroom every 6 to 12 months might reduce your exposure to mycobacteria, but this is only hypothetically helpful. You cannot spread mycobacterial infections to others, so you do not need to worry about \"being contagious.\" We cannot totally explain why some people like yourself get mycobacteria infection in the lungs, but it is probably a combination of the lungs being a favorable or permissive environment for infection, genetic, and immune system factors. As we discussed in clinic, these infections are difficult to treat because they require multiple antibiotics and treatment durations lasting months. Treating the infection for 12+ months AFTER the FIRST negative sputum culture is important to reduce the risk of recurrent infection. Some data suggest that the difference in success rates between 12+ months of treatment and shorter lengths of treatment is on the order of ~80% versus ~20%. A good resource for more information about mycobacteria infection of the lungs is the web site: https://www.ntmfacts.com     Before starting treatment, we need to check several tests to make sure they are normal. If these tests are normal, " it will allow me to safely prescribe the regimen. These tests include:  1. Complete blood count (rifampin and rifabutin can cause low white blood count) - this was taken 11/17/2023  2. Liver function tests (azithromycin, clarithromycin, rifampin, and rifabutin can cause liver inflammation, a sign of toxicity) this was taken 11/17/2023  3. Electrocardiogram (azithromycin and clarithromycin can prolong the QT interval, which could trigger a life-threatening disturbance to heart rhythm) - this is needed   4. Eye examination (ethambutol can cause loss of color vision and/or decreased visual acuity due to irritation of the optic nerve) she is scheduled in March 2024  5. CT scan of the chest (we need to confirm the pattern of MAC infection because that will influence the intensity of the treatment regimen)  6. Sputum culture or bronchoscopy culture (to keep checking for growth of the MAC in the lungs) - completed 11/1/2023    Thank you for visiting the Pulmonary clinic today!       Return to clinic after _3 months and after PFTs, CT scan complete  or sooner if needed   Alicia Linares CNP  My office number is [phone] -     Best way to get a hold of me is to call my office --> Please do not send me follow my health messages  Any test results will be discussed at next visit -- please make sure to make a follow up appt after testing.

## 2023-12-13 ENCOUNTER — TELEPHONE (OUTPATIENT)
Dept: PULMONOLOGY | Facility: CLINIC | Age: 78
End: 2023-12-13
Payer: MEDICARE

## 2023-12-13 NOTE — TELEPHONE ENCOUNTER
Patient call and left a voicemail for Alicia.    She stated the MD referral you gave her, the MD is not on her plan.    Also the Rx that you sent for her is not covered by her insurance.    She is asking that you give her a call to discuss at [phone].

## 2023-12-14 ENCOUNTER — TELEPHONE (OUTPATIENT)
Dept: PRIMARY CARE | Facility: CLINIC | Age: 78
End: 2023-12-14
Payer: MEDICARE

## 2023-12-14 NOTE — TELEPHONE ENCOUNTER
Spoke to patient- she is referred to [facility] that is covered by her insurance. Patient now has an inhaler that is covered.

## 2023-12-14 NOTE — TELEPHONE ENCOUNTER
Pt called to leave message for Dr. Sparks    Pt stated Dr. Sparks know she has been seeing Dr Schneider, pulmonary doctor but wonder if he could he recommend a infectious disease doctor.    Please call pt at [phone]

## 2023-12-26 ENCOUNTER — ANCILLARY PROCEDURE (OUTPATIENT)
Dept: RADIOLOGY | Facility: CLINIC | Age: 78
End: 2023-12-26
Payer: MEDICARE

## 2023-12-26 DIAGNOSIS — A31.0 MYCOBACTERIUM AVIUM COMPLEX (MULTI): ICD-10-CM

## 2023-12-26 DIAGNOSIS — R05.3 CHRONIC COUGH: ICD-10-CM

## 2023-12-26 PROCEDURE — 71250 CT THORAX DX C-: CPT

## 2023-12-26 PROCEDURE — 71250 CT THORAX DX C-: CPT | Performed by: RADIOLOGY

## 2024-01-02 NOTE — PROGRESS NOTES
CT chest with 12/26/2023 reveals Consolidation: Wedge-shaped 25 x 13 mm right upper lobe consolidation  Many of the nodular lesions on prior CTs are unchanged, including the 17 mm luisa-like left upper lobe nodule and a left upper lobe nodule with a central lucency, in aggregate again measuring about 8 mm (both  on lung windows axial series 303, image 50)      The 7 mm left upper lobe nodule on axial series 303, image 73 is new  from 5 December 2023      10 mm subpleural right upper lobe nodule on same series image 95 also  new from prior      At least 11 mm nodule also right upper lobe on the same image 95 is  also new      Adjacent 10 and 15 mm right lower lobe nodules have decreased in  size, with no nodule larger than 5 mm in this portion of the lung on  today's exam      New large nodules are in the lowest aspect of the middle lobe and  also the right lower lobe as well, for example the 14 mm subpleural  right lower lobe nodule on image 222      Middle lobe abnormalities that can be seen with chronic MAC infection  are no different from prior exam September, grade 3          Discussed results with patient. She has upcoming appt with ID on 1/11/2024.  Will need repeat Ct chest once on treatment for MAC.

## 2024-02-26 DIAGNOSIS — M19.90 OSTEOARTHRITIS, UNSPECIFIED OSTEOARTHRITIS TYPE, UNSPECIFIED SITE: ICD-10-CM

## 2024-02-26 RX ORDER — NAPROXEN 500 MG/1
500 TABLET ORAL
Qty: 60 TABLET | Refills: 1 | Status: SHIPPED | OUTPATIENT
Start: 2024-02-26

## 2024-03-05 ASSESSMENT — ENCOUNTER SYMPTOMS: COUGH: 1

## 2024-03-05 NOTE — PROGRESS NOTES
Patient: Susu Lackey    [id number]  : 1945 -- AGE 78 y.o.    Provider: Alicia Linares CNP     Location [facility]   Service Date: 3/14/2024       Department of Medicine  Division of Pulmonary, Critical Care, and Sleep Medicine         [facility]  Follow Up Visit Note    HISTORY OF PRESENT ILLNESS     HISTORY OF PRESENT ILLNESS   Susu Lackey is a 78 y.o. female who presents to a [facility] for a follow up visit with concerns of MAC. . I have independently interviewed and examined the patient in the office and reviewed available records.      DATE OF LAST VISIT: 2023      Bronchoscope     History Of Present Illness  Mrs. Lackey is a 77-year-old female with past medical history significant for intermittent asthma who presented to the office today regarding multiple pulmonary nodules noted on a CT scan of chest.      The patient has been following with primary care physician and GI clinics for unintentional weight loss over the past 2 years. During work-up CT scan of chest revealed waxing and waning centrilobular nodular infiltrates that was concerning for possible chronic respiratory infection. The patient is a lifetime non-smoker. She denies high risk occupational exposures, was a  until she retired. She is independent in her daily activities and denies limiting shortness of breath. She reports occasional cough and chest congestion. She denies recurrent fevers or night sweats. She was started on boost cans by GI and since then she reports she has been gaining weight back again. Most recent CT scan of chest in 2020 is reviewed today which revealed again waxing and waning centrilobular nodular infiltrate.     No past history of connective tissue disease  No family history of chronic lung disease reported.     Follow up 2021:  No acute respiratory symptoms today. She did not have productive  cough to submit sputum after last visit. She denies recurrent fevers or night sweats. She has gained about 6 pounds. No acute chest pain or hemoptysis. She reports occasional wheezing with weather changes from asthma. She has not been using any bronchodilators. She has history of glaucoma for which she is following up with ophthalmology on regular basis. She reports arthritis in her hands and spine that is attributed to osteoarthritis in the past.     Follow-up 7/29/21:  No acute respiratory symptoms again today. Susu reports occasional cough that is mostly dry. She did not submit any sputum for cultures. She denies recurrent fevers or night sweats. Her weight has been stable. Using albuterol only as needed. Repeat CT scan of chest revealed wax and wane pulmonary nodules. Results are discussed with patient in details today.     Follow up 3/15/2022:  Susu stable respiratory status since last visit. Weight has been stable. No recurrent fevers or night sweats. She has occasional cough mainly in the afternoon with postnasal drainage. No purulent sputum production or hemoptysis. She is up-to-date with vaccinations.     Follow-up 4/21/2022:  (phone visit)  Susu reports a stable respiratory status. No acute fevers, recurrent chills or night sweats. Weight has been stable. Cough is mostly dry and is not persistent. Repeated CT scan of chest is reviewed today which revealed overall stability in the centrilobular nodularity and mild bronchiectasis. We discussed today that the stability of CT scan over the past 2-3 years is reassuring. Etiology could be related to chronic respiratory infection though she does not have any signs/symptoms of active infection right now. Immunoglobulin levels are within normal range. We discussed today that bronchoscopy with bronchoalveolar lavage would be the next step to further evaluate for chronic infection though given that her symptoms are stable now we could continue observation as  well. Bronchoscopy is deferred and we will follow-up with her in the next 4-6 months or sooner if necessary. She is informed to call my office should she develop new respiratory symptoms.     Please excuse any misspellings or unintended errors related to the Dragon speech recognition software used to dictate this note.      Follow-up 10/21/2022:  No acute respiratory symptoms today or interval history of recurrent fevers, chills or night sweats. No weight loss. Denies any hemoptysis. She reports intermittent cough that is minimally productive. No change in respiratory status over the past year.     Follow-up 2/14/2023:  Susu reports stable respiratory status since last visit. Most recent CT scan of chest is reviewed today which revealed stable pulmonary nodules when compared to his previous scan. She denies recurrent fevers, persistent cough or sputum production. No night sweats. No change in her weight over the past year. She is able to conduct her daily activities without respiratory limitation.      Follow up 11/1/2023:  Most recent CT scan of chest showed waxing and waning areas of pulmonary nodularity with diffuse bronchial thickening. Plan is for Bronchoscopy with BAL and possible Tbbx today.     Current Zvdxyib63/7/2023    On today's visit, the patient reports feeling good until this past Sunday. Feeling worse in the afternoo. C/o feeling congestion in the chest towards evening.  C/o dry cough.  C/o AVINA after strenous exercise or walking fast. Denies fever /chills chest pain or GERD.  Denies night time cough. NO ER visits. Declines flu vaccine.  She is not using inhaler as she does not need    Current History 3/14/2024    On today's visit, the patient reports she said she was seen by ID (Dr Palacio) in Jan, per her understanding since she is asymptomatic she does not need treated and planning to have a CT chest in April-May to re-evaluate if improving or worsening and will decide on treatment then.      C/o  nasal drainage and post nasal drip. Using flonase   She has glaucoma and is concerned with Breo  She had the flu last week  Denies cough, wheeze, Fevers/chills  She is more active as she is outside walking. She can walk 15-20min, Denies AVINA   Denies chest pain or tightness  GERD denies     She is scheduled for CT in April or May   No night time cough        REVIEW OF SYSTEMS     REVIEW OF SYSTEMS  Review of Systems   Respiratory:  Positive for cough.    Cardiovascular:  Positive for leg swelling.   All other systems reviewed and are negative.        ALLERGIES AND MEDICATIONS     ALLERGIES  Allergies   Allergen Reactions    Dog Dander Other    Feathers Other    House Dust Mite Other       MEDICATIONS  Current Outpatient Medications   Medication Sig Dispense Refill    Combigan 0.2-0.5 % ophthalmic solution Administer 1 drop into both eyes once daily.      fenofibrate micronized (Lofibra) 200 mg capsule Take 1 capsule (200 mg) by mouth once daily with a meal. 90 capsule 3    fluticasone furoate-vilanteroL (Breo Ellipta) 100-25 mcg/dose inhaler Inhale 1 puff once daily. 28 each 3    ipratropium (Atrovent) 21 mcg (0.03 %) nasal spray Administer 1 spray into each nostril every 12 hours.      latanoprost (Xalatan) 0.005 % ophthalmic solution Administer 1 drop into both eyes once daily.      montelukast (Singulair) 10 mg tablet TAKE 1 TABLET BY MOUTH DAILY AS DIRECTED 90 tablet 3    multivitamin with iron (pediatric multivitamin-iron) tablet chewable split tablet Take 1 half tablet by mouth once daily.      naproxen (Naprosyn) 500 mg tablet Take 1 tablet (500 mg) by mouth 2 times a day with meals. 60 tablet 1    pantoprazole (ProtoNix) 40 mg EC tablet Take 1 tablet (40 mg) by mouth once daily. 90 tablet 3     No current facility-administered medications for this visit.         PAST HISTORY     PAST MEDICAL HISTORY  She  has a past medical history of Abnormal findings on diagnostic imaging of other specified body structures  (11/22/2017), Encounter for screening mammogram for malignant neoplasm of breast, Epigastric pain (10/08/2019), Gastro-esophageal reflux disease with esophagitis, without bleeding (12/13/2019), Nonscarring hair loss, unspecified (12/13/2019), Other acute recurrent sinusitis (11/07/2017), Other conditions influencing health status (12/13/2019), Other symptoms and signs involving general sensations and perceptions (09/28/2018), Personal history of other diseases of the nervous system and sense organs, Personal history of other diseases of the respiratory system (10/08/2014), Personal history of other diseases of the respiratory system (11/14/2018), Personal history of other diseases of the respiratory system (09/28/2018), Personal history of other diseases of the respiratory system, Personal history of other medical treatment (05/08/2017), Personal history of other medical treatment (05/15/2019), Personal history of other specified conditions (09/26/2018), Personal history of other specified conditions (05/24/2018), Personal history of other specified conditions (08/20/2020), and Vulvar cyst (07/17/2014).       PAST SURGICAL HISTORY  Past Surgical History:   Procedure Laterality Date    COLONOSCOPY  10/29/2012    Complete Colonoscopy    FOOT SURGERY  10/29/2012    Foot Surgery    OTHER SURGICAL HISTORY  01/11/2014    Vaginal Pap smear    TONSILLECTOMY  11/12/2012    Tonsillectomy       IMMUNIZATION HISTORY  Immunization History   Administered Date(s) Administered    Moderna COVID-19 vaccine, bivalent, blue cap/gray label *Check age/dose* 10/13/2022    Moderna SARS-CoV-2 Vaccination 03/02/2021, 03/30/2021, 12/27/2021       SOCIAL HISTORY  She  reports that she has never smoked. She has never used smokeless tobacco. She reports current alcohol use. She reports that she does not use drugs. She Patient     OCCUPATIONAL/ENVIRONMENTAL HISTORY  Previously worked as:    DOES/DOES NOT: does not  have known exposure to asbestos, silica, beryllium or inhaled metals.  DOES/DOES NOT: does not have exposure to birds or exotic animals.    FAMILY HISTORY  Family History   Problem Relation Name Age of Onset    Hemolytic uremic syndrome Father      Breast cancer Sister       DOES/DOES NOT: does not have a family history of pulmonary disease.  DOES/DOES NOT: does have a family history of cancer.  DOES/DOES NOT: does not have a family history of autoimmune disorders.    PHYSICAL EXAM     VITAL SIGNS: There were no vitals taken for this visit. There were no vitals taken for this visit.     PREVIOUS WEIGHTS:  Wt Readings from Last 3 Encounters:   12/07/23 51.4 kg (113 lb 6.4 oz)   11/17/23 51.3 kg (113 lb)   11/01/23 49.9 kg (110 lb)       Physical Exam  Constitutional:       Appearance: Normal appearance.   HENT:      Head: Normocephalic and atraumatic.      Nose: Nose normal.      Mouth/Throat:      Mouth: Mucous membranes are moist.      Pharynx: Oropharynx is clear.   Eyes:      Extraocular Movements: Extraocular movements intact.      Pupils: Pupils are equal, round, and reactive to light.   Cardiovascular:      Rate and Rhythm: Normal rate and regular rhythm.      Pulses: Normal pulses.   Pulmonary:      Effort: Pulmonary effort is normal.      Breath sounds: Normal breath sounds.   Abdominal:      General: Bowel sounds are normal.      Palpations: Abdomen is soft.   Musculoskeletal:         General: Normal range of motion.   Skin:     General: Skin is warm and dry.   Neurological:      General: No focal deficit present.      Mental Status: She is alert and oriented to person, place, and time. Mental status is at baseline.   Psychiatric:         Mood and Affect: Mood normal.         Behavior: Behavior normal.         Thought Content: Thought content normal.         Judgment: Judgment normal.           RESULTS/DATA     Pulmonary Function Test Results   12/13/2022        Chest Radiograph     No results  found      Chest CT Scan     Study Result    Narrative & Impression   Interpreted By:  Lalo Love,   STUDY:  CT CHEST WO IV CONTRAST;  12/26/2023 11:12 am      INDICATION:  Signs/Symptoms:nodules, MAC Infection.      COMPARISON:  CT chest without contrast 5 September 2023; seven other CTs back  through the oldest available, 14 December 2017      ACCESSION NUMBER(S):  [id number]      ORDERING CLINICIAN:  EDMOND AGARWAL      TECHNIQUE:  Helical CT chest from thoracic inlet through the hemidiaphragms  without intravenous contrast      FINDINGS:  LUNGS / AIRSPACES / AIRWAYS:      LARGE AIRWAYS  Filling defect: Scattered few mucous plugs  Wall thickening: Negative  Bronchiectasis: Negative  Other: N/A      AIRSPACES  Fibrosis: Negative  Emphysema: Negative  Consolidation: Wedge-shaped 25 x 13 mm right upper lobe consolidation  or less likely nodule just above the minor fissure anteriorly is new.  No air bronchograms within it, but there may be a few mucus impacted  airways proximal to it Ground glass airspace disease: Negative  Edema: Negative  Nodule / Mass:      Many of the nodular lesions on prior CTs are unchanged, including the  17 mm luisa-like left upper lobe nodule and a left upper lobe nodule  with a central lucency, in aggregate again measuring about 8 mm (both  on lung windows axial series 303, image 50)      The 7 mm left upper lobe nodule on axial series 303, image 73 is new  from 5 December 2023      10 mm subpleural right upper lobe nodule on same series image 95 also  new from prior      At least 11 mm nodule also right upper lobe on the same image 95 is  also new      Adjacent 10 and 15 mm right lower lobe nodules have decreased in  size, with no nodule larger than 5 mm in this portion of the lung on  today's exam      New large nodules are in the lowest aspect of the middle lobe and  also the right lower lobe as well, for example the 14 mm subpleural  right lower lobe nodule on image 222      Middle  lobe abnormalities that can be seen with chronic MAC infection  are no different from prior exam September, grade 3      PLEURA:  Effusion:  Both sides negative  Pneumothorax:  Both sides negative  Other:  n/a      CARDIOVASCULAR:  Heart size:  Enlarged but unchanged  Pericardial effusion:  Negative  Thoracic aortic aneurysm:  Negative  Pulmonary arteries:  Unchanged ectasia  Heart failure change:  Negative.  No sign of interstitial or alveolar  edema. Other:  n/a      NONVASCULAR MEDIASTINUM:  Esophagus:  Grossly normal by CT  Mediastinal Mass:  Negative  Hiatal hernia:  None  Other:  n/a      LYMPH NODES:  No thoracic adenopathy      CHEST WALL:  Soft tissues of the chest wall are unremarkable      SKELETON:  No acute or contributory abnormality      UPPER ABDOMEN:  Included subdiaphragmatic structures have no acute or contributory  abnormality      IMPRESSION:  Numerous scattered pulmonary nodules including some that are new from  September 2023 and need follow-up/surveillance      Some nodules have resolved since September 2023, but the majority  that were present in September 2023 have not change      Middle lobe findings that can be seen with chronic MAC infection are  no different from September 2023      In the right upper lobe just above the minor fissure, there is a  wedge-shaped new airspace opacity more suspect for pneumonia than a  nodule      Follow-up CT imaging will be needed      No thoracic adenopathy, pleural or pericardial effusion has developed.         Echocardiogram     No results found       Labwork   Complete Blood Count  Lab Results   Component Value Date    WBC 7.3 11/17/2023    HGB 14.4 11/17/2023    HCT 43.2 11/17/2023    MCV 98 11/17/2023     11/17/2023       Peripheral Eosinophil Count/Percentage:   Eosinophils Absolute (x10*3/uL)   Date Value   11/17/2023 0.12     Eosinophils % (%)   Date Value   11/17/2023 1.6       Serum Immunoglobulin E:    IgE (IU/mL)   Date Value    09/27/2023 7          ASSESSMENT/PLAN     Ms. Lackey is a 78 y.o. female and  has a past medical history of Abnormal findings on diagnostic imaging of other specified body structures (11/22/2017), Encounter for screening mammogram for malignant neoplasm of breast, Epigastric pain (10/08/2019), Gastro-esophageal reflux disease with esophagitis, without bleeding (12/13/2019), Nonscarring hair loss, unspecified (12/13/2019), Other acute recurrent sinusitis (11/07/2017), Other conditions influencing health status (12/13/2019), Other symptoms and signs involving general sensations and perceptions (09/28/2018), Personal history of other diseases of the nervous system and sense organs, Personal history of other diseases of the respiratory system (10/08/2014), Personal history of other diseases of the respiratory system (11/14/2018), Personal history of other diseases of the respiratory system (09/28/2018), Personal history of other diseases of the respiratory system, Personal history of other medical treatment (05/08/2017), Personal history of other medical treatment (05/15/2019), Personal history of other specified conditions (09/26/2018), Personal history of other specified conditions (05/24/2018), Personal history of other specified conditions (08/20/2020), and Vulvar cyst (07/17/2014). She presents to the [facility] for follow up of cough and MAC infection     Problem List and Orders      Assessment and Plan / Recommendations:    S/p Bronch: BAL +   Pseudomonas aeruginosa and e coli, + acid fast bacilli + Mycobacterium avium   Fungal culture negative -   Cytology - No malignant cells identified                GMS stain is negative for organisms: fungal and pneumocystis organisms.                Significant acute inflammation  Seen by ROBINSON Palacio In jan 2024  Repeat Ct chest - due in April/May   Will stop breo ellipta 2 puffs once a day, rinse after using - due to moderate  "obstruction and air trapping.   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 Mycobacteria are germs that live in the environment, particularly on wet or moist surfaces. This is why mycobacteria like MAC can be found in water faucets, shower heads, and soil. Because mycobacteria are found in so many places it is nearly impossible to avoid. Changing the shower head in your bathroom every 6 to 12 months might reduce your exposure to mycobacteria, but this is only hypothetically helpful. You cannot spread mycobacterial infections to others, so you do not need to worry about \"being contagious.\" We cannot totally explain why some people like yourself get mycobacteria infection in the lungs, but it is probably a combination of the lungs being a favorable or permissive environment for infection, genetic, and immune system factors. As we discussed in clinic, these infections are difficult to treat because they require multiple antibiotics and treatment durations lasting months. Treating the infection for 12+ months AFTER the FIRST negative sputum culture is important to reduce the risk of recurrent infection. Some data suggest that the difference in success rates between 12+ months of treatment and shorter lengths of treatment is on the order of ~80% versus ~20%. A good resource for more information about mycobacteria infection of the lungs is the web site: https://www.ntmfacts.com     Before starting treatment, we need to check several tests to make sure they are normal. If these tests are normal, it will allow me to safely prescribe the regimen. These tests include:  1. Complete blood count (rifampin and rifabutin can cause low white blood count) - this was taken 11/17/2023  2. Liver function tests (azithromycin, clarithromycin, rifampin, and rifabutin can cause liver inflammation, a sign of " toxicity) this was taken 11/17/2023  3. Electrocardiogram (azithromycin and clarithromycin can prolong the QT interval, which could trigger a life-threatening disturbance to heart rhythm) - this is needed   4. Eye examination (ethambutol can cause loss of color vision and/or decreased visual acuity due to irritation of the optic nerve) she is scheduled in March 2024  5. CT scan of the chest (we need to confirm the pattern of MAC infection because that will influence the intensity of the treatment regimen)  6. Sputum culture or bronchoscopy culture (to keep checking for growth of the MAC in the lungs) - completed 11/1/2023    Thank you for visiting the Pulmonary clinic today!       Return to clinic after _3 months and  CT scan complete  or sooner if needed   Alicia Linares CNP  My office number is [phone] -     Best way to get a hold of me is to call my office --> Please do not send me follow my health messages  Any test results will be discussed at next visit -- please make sure to make a follow up appt after testing.

## 2024-03-14 ENCOUNTER — OFFICE VISIT (OUTPATIENT)
Dept: PULMONOLOGY | Facility: CLINIC | Age: 79
End: 2024-03-14
Payer: MEDICARE

## 2024-03-14 VITALS
HEART RATE: 79 BPM | TEMPERATURE: 97.2 F | OXYGEN SATURATION: 93 % | DIASTOLIC BLOOD PRESSURE: 74 MMHG | SYSTOLIC BLOOD PRESSURE: 149 MMHG | BODY MASS INDEX: 20.55 KG/M2 | HEIGHT: 63 IN | WEIGHT: 116 LBS

## 2024-03-14 DIAGNOSIS — R05.3 CHRONIC COUGH: ICD-10-CM

## 2024-03-14 DIAGNOSIS — A31.0 MYCOBACTERIUM AVIUM COMPLEX (MULTI): Primary | ICD-10-CM

## 2024-03-14 PROCEDURE — 1159F MED LIST DOCD IN RCRD: CPT | Performed by: NURSE PRACTITIONER

## 2024-03-14 PROCEDURE — 99214 OFFICE O/P EST MOD 30 MIN: CPT | Performed by: NURSE PRACTITIONER

## 2024-03-14 PROCEDURE — 1036F TOBACCO NON-USER: CPT | Performed by: NURSE PRACTITIONER

## 2024-03-14 PROCEDURE — 1126F AMNT PAIN NOTED NONE PRSNT: CPT | Performed by: NURSE PRACTITIONER

## 2024-03-14 PROCEDURE — 1157F ADVNC CARE PLAN IN RCRD: CPT | Performed by: NURSE PRACTITIONER

## 2024-03-14 ASSESSMENT — ENCOUNTER SYMPTOMS
LOSS OF SENSATION IN FEET: 0
OCCASIONAL FEELINGS OF UNSTEADINESS: 0
DEPRESSION: 0

## 2024-03-14 ASSESSMENT — PAIN SCALES - GENERAL: PAINLEVEL: 0-NO PAIN

## 2024-03-14 NOTE — PATIENT INSTRUCTIONS
"S/p Bronch: BAL +   Pseudomonas aeruginosa and e coli, + acid fast bacilli + Mycobacterium avium   Fungal culture negative -   Cytology - No malignant cells identified                GMS stain is negative for organisms: fungal and pneumocystis organisms.                Significant acute inflammation  Seen by ROBINSON Palacio In jan 2024  Repeat Ct chest - due in April/May   Will stop breo ellipta 2 puffs once a day, rinse after using - due to moderate obstruction and air trapping.   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 Mycobacteria are germs that live in the environment, particularly on wet or moist surfaces. This is why mycobacteria like MAC can be found in water faucets, shower heads, and soil. Because mycobacteria are found in so many places it is nearly impossible to avoid. Changing the shower head in your bathroom every 6 to 12 months might reduce your exposure to mycobacteria, but this is only hypothetically helpful. You cannot spread mycobacterial infections to others, so you do not need to worry about \"being contagious.\" We cannot totally explain why some people like yourself get mycobacteria infection in the lungs, but it is probably a combination of the lungs being a favorable or permissive environment for infection, genetic, and immune system factors. As we discussed in clinic, these infections are difficult to treat because they require multiple antibiotics and treatment durations lasting months. Treating the infection for 12+ months AFTER the FIRST negative sputum culture is important to reduce the risk of recurrent infection. Some data suggest that the difference in success rates between 12+ months of treatment and shorter lengths of treatment is on the order of ~80% versus ~20%. A good resource for more information about mycobacteria infection of the lungs is the web site: " https://www.ntmfacts.com     Before starting treatment, we need to check several tests to make sure they are normal. If these tests are normal, it will allow me to safely prescribe the regimen. These tests include:  1. Complete blood count (rifampin and rifabutin can cause low white blood count) - this was taken 11/17/2023  2. Liver function tests (azithromycin, clarithromycin, rifampin, and rifabutin can cause liver inflammation, a sign of toxicity) this was taken 11/17/2023  3. Electrocardiogram (azithromycin and clarithromycin can prolong the QT interval, which could trigger a life-threatening disturbance to heart rhythm) - this is needed   4. Eye examination (ethambutol can cause loss of color vision and/or decreased visual acuity due to irritation of the optic nerve) she is scheduled in March 2024  5. CT scan of the chest (we need to confirm the pattern of MAC infection because that will influence the intensity of the treatment regimen)  6. Sputum culture or bronchoscopy culture (to keep checking for growth of the MAC in the lungs) - completed 11/1/2023    Thank you for visiting the Pulmonary clinic today!       Return to clinic after _3 months and  CT scan complete  or sooner if needed   Alicia Linares CNP  My office number is [phone] -     Best way to get a hold of me is to call my office --> Please do not send me follow my health messages  Any test results will be discussed at next visit -- please make sure to make a follow up appt after testing.

## 2024-04-26 DIAGNOSIS — J30.9 ALLERGIC RHINITIS, UNSPECIFIED SEASONALITY, UNSPECIFIED TRIGGER: ICD-10-CM

## 2024-04-26 RX ORDER — MONTELUKAST SODIUM 10 MG/1
10 TABLET ORAL DAILY
Qty: 90 TABLET | Refills: 3 | Status: SHIPPED | OUTPATIENT
Start: 2024-04-26

## 2024-05-03 ENCOUNTER — TELEPHONE (OUTPATIENT)
Dept: PULMONOLOGY | Facility: CLINIC | Age: 79
End: 2024-05-03
Payer: MEDICARE

## 2024-05-03 NOTE — TELEPHONE ENCOUNTER
Patient called to verify dose of spiriva, message left on voicemail. Please document dose when she calls back.

## 2024-05-03 NOTE — TELEPHONE ENCOUNTER
Patient called in asking if you can send a script for her spiriva. She was given a sample and it's working well. Her pharmacy is [facility] in [place] at [address]. Thanks

## 2024-05-06 DIAGNOSIS — R05.3 CHRONIC COUGH: Primary | ICD-10-CM

## 2024-05-06 DIAGNOSIS — J45.20 INTERMITTENT ASTHMA, UNSPECIFIED ASTHMA SEVERITY, UNSPECIFIED WHETHER COMPLICATED (HHS-HCC): ICD-10-CM

## 2024-05-06 RX ORDER — TIOTROPIUM BROMIDE INHALATION SPRAY 1.56 UG/1
2 SPRAY, METERED RESPIRATORY (INHALATION) DAILY
Qty: 4 G | Refills: 3 | Status: SHIPPED | OUTPATIENT
Start: 2024-05-06 | End: 2024-06-05

## 2024-05-13 ENCOUNTER — APPOINTMENT (OUTPATIENT)
Dept: PRIMARY CARE | Facility: CLINIC | Age: 79
End: 2024-05-13
Payer: MEDICARE

## 2024-05-14 ENCOUNTER — HOSPITAL ENCOUNTER (OUTPATIENT)
Dept: RADIOLOGY | Facility: CLINIC | Age: 79
Discharge: HOME | End: 2024-05-14
Payer: MEDICARE

## 2024-05-14 DIAGNOSIS — A31.0 MYCOBACTERIUM AVIUM COMPLEX (MULTI): ICD-10-CM

## 2024-05-14 PROCEDURE — 71250 CT THORAX DX C-: CPT

## 2024-05-14 PROCEDURE — 71250 CT THORAX DX C-: CPT | Performed by: RADIOLOGY

## 2024-05-23 ENCOUNTER — OFFICE VISIT (OUTPATIENT)
Dept: PRIMARY CARE | Facility: CLINIC | Age: 79
End: 2024-05-23
Payer: MEDICARE

## 2024-05-23 VITALS
RESPIRATION RATE: 16 BRPM | SYSTOLIC BLOOD PRESSURE: 168 MMHG | DIASTOLIC BLOOD PRESSURE: 90 MMHG | BODY MASS INDEX: 20.98 KG/M2 | HEART RATE: 70 BPM | WEIGHT: 114 LBS | TEMPERATURE: 98.1 F | OXYGEN SATURATION: 97 % | HEIGHT: 62 IN

## 2024-05-23 DIAGNOSIS — K21.9 GASTROESOPHAGEAL REFLUX DISEASE WITHOUT ESOPHAGITIS: ICD-10-CM

## 2024-05-23 DIAGNOSIS — J30.9 ALLERGIC RHINITIS, UNSPECIFIED SEASONALITY, UNSPECIFIED TRIGGER: Primary | ICD-10-CM

## 2024-05-23 DIAGNOSIS — J47.9 BRONCHIECTASIS WITHOUT COMPLICATION (MULTI): ICD-10-CM

## 2024-05-23 DIAGNOSIS — I10 PRIMARY HYPERTENSION: ICD-10-CM

## 2024-05-23 DIAGNOSIS — E78.2 MIXED HYPERLIPIDEMIA: ICD-10-CM

## 2024-05-23 PROBLEM — N18.30 CKD (CHRONIC KIDNEY DISEASE) STAGE 3, GFR 30-59 ML/MIN (MULTI): Status: RESOLVED | Noted: 2023-05-16 | Resolved: 2024-05-23

## 2024-05-23 PROBLEM — E46 PROTEIN-CALORIE MALNUTRITION, UNSPECIFIED SEVERITY (MULTI): Status: RESOLVED | Noted: 2023-05-17 | Resolved: 2024-05-23

## 2024-05-23 PROCEDURE — 1170F FXNL STATUS ASSESSED: CPT | Performed by: INTERNAL MEDICINE

## 2024-05-23 PROCEDURE — 93000 ELECTROCARDIOGRAM COMPLETE: CPT | Performed by: INTERNAL MEDICINE

## 2024-05-23 PROCEDURE — 3080F DIAST BP >= 90 MM HG: CPT | Performed by: INTERNAL MEDICINE

## 2024-05-23 PROCEDURE — 1036F TOBACCO NON-USER: CPT | Performed by: INTERNAL MEDICINE

## 2024-05-23 PROCEDURE — 1159F MED LIST DOCD IN RCRD: CPT | Performed by: INTERNAL MEDICINE

## 2024-05-23 PROCEDURE — 3077F SYST BP >= 140 MM HG: CPT | Performed by: INTERNAL MEDICINE

## 2024-05-23 PROCEDURE — 1160F RVW MEDS BY RX/DR IN RCRD: CPT | Performed by: INTERNAL MEDICINE

## 2024-05-23 PROCEDURE — 1157F ADVNC CARE PLAN IN RCRD: CPT | Performed by: INTERNAL MEDICINE

## 2024-05-23 PROCEDURE — G0439 PPPS, SUBSEQ VISIT: HCPCS | Performed by: INTERNAL MEDICINE

## 2024-05-23 PROCEDURE — 99214 OFFICE O/P EST MOD 30 MIN: CPT | Performed by: INTERNAL MEDICINE

## 2024-05-23 PROCEDURE — 99397 PER PM REEVAL EST PAT 65+ YR: CPT | Performed by: INTERNAL MEDICINE

## 2024-05-23 RX ORDER — AMLODIPINE BESYLATE 2.5 MG/1
2.5 TABLET ORAL DAILY
Qty: 30 TABLET | Refills: 5 | Status: SHIPPED | OUTPATIENT
Start: 2024-05-23 | End: 2024-11-19

## 2024-05-23 RX ORDER — PANTOPRAZOLE SODIUM 40 MG/1
40 TABLET, DELAYED RELEASE ORAL DAILY
Qty: 90 TABLET | Refills: 3 | Status: SHIPPED | OUTPATIENT
Start: 2024-05-23

## 2024-05-23 RX ORDER — IPRATROPIUM BROMIDE 21 UG/1
1 SPRAY, METERED NASAL EVERY 12 HOURS
Qty: 30 ML | Refills: 2 | Status: SHIPPED | OUTPATIENT
Start: 2024-05-23

## 2024-05-23 ASSESSMENT — ACTIVITIES OF DAILY LIVING (ADL)
MANAGING_FINANCES: INDEPENDENT
BATHING: INDEPENDENT
TAKING_MEDICATION: INDEPENDENT
DRESSING: INDEPENDENT
DOING_HOUSEWORK: INDEPENDENT
GROCERY_SHOPPING: INDEPENDENT

## 2024-05-23 ASSESSMENT — ENCOUNTER SYMPTOMS
LIGHT-HEADEDNESS: 1
SHORTNESS OF BREATH: 1
DIARRHEA: 0
CONSTIPATION: 0
COUGH: 1
ABDOMINAL PAIN: 0

## 2024-05-23 NOTE — PROGRESS NOTES
Patient here for a medicare wellness visit / physical and follow up    Subjective   Patient ID: Susu Lackey is a 79 y.o. female who presents for Annual Exam, Medicare Annual Wellness Visit Subsequent, and Follow-up.    The patient reports ongoing dyspnea with exertion that is associated with some lightheadedness, particularly on standing from a seated position, since her last clinic visit.  She inquires whether she should set up a follow-up appointment with  from Infectious Disease.  The patient denies any chest pain or chest pressure at rest or with exertion.  An EKG in the office today was unremarkable.  She endorses a dry cough and a sensation of persistent phlegm in her throat.    The patient is also following with Pulmonology.  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      The patient denies any hearing impairment or vision changes, and follows with Ophthalmology for glaucoma.  She takes Metamucil supplementation regularly and denies any abdominal pain, or bowel problems.       Review of Systems   HENT:  Negative for hearing loss.    Eyes:  Negative for visual disturbance.   Respiratory:  Positive for cough and shortness of breath.    Gastrointestinal:  Negative for abdominal pain, constipation and diarrhea.   Neurological:  Positive for light-headedness.       Objective   Physical Exam  Constitutional:       Appearance: Normal appearance.   Neck:      Vascular: No carotid bruit.   Cardiovascular:      Rate and Rhythm: Normal rate and regular rhythm.      Heart sounds: Normal heart sounds.   Pulmonary:      Effort: Pulmonary effort is normal.      Breath sounds: Normal breath sounds.   Abdominal:      General: Bowel sounds are normal.      Palpations: Abdomen is soft.      Tenderness: There is no abdominal tenderness.   Skin:     General: Skin is warm and dry.   Neurological:      General: No focal deficit present.      Mental Status: She is alert and  oriented to person, place, and time. Mental status is at baseline.   Psychiatric:         Mood and Affect: Mood normal.         Behavior: Behavior normal.         Assessment/Plan   Problem List Items Addressed This Visit             ICD-10-CM    Allergic rhinitis - Primary J30.9    Relevant Medications    ipratropium (Atrovent) 21 mcg (0.03 %) nasal spray    GERD (gastroesophageal reflux disease) K21.9    Relevant Medications    pantoprazole (ProtoNix) 40 mg EC tablet    Hyperlipidemia E78.5    Relevant Orders    ECG 12 lead (Clinic Performed) (Completed)     Other Visit Diagnoses         Codes    Primary hypertension     I10    Relevant Medications    amLODIPine (Norvasc) 2.5 mg tablet            Medicare Wellness Examination Done  -  Discussed healthy diet and regular exercise.    -  Physical exam overall unremarkable. Immunizations reviewed and updated accordingly. Healthy lifestyle choices discussed (tobacco avoidance, appropriate alcohol use, avoidance of illicit substances).   -  Patient is wearing seatbelt.   -  Screening lab work ordered as indicated.    -  Age appropriate screening tests reviewed with patient.       EKG unremarkable compared to previous.    IMPRESSION/PLAN :       MAC w/ dyspnea  - Advised patient to follow-up with  from Infectious Disease.     HTN  - /90 in the office today, with similar on repeat.  Prescribed amlodipine 2.5mg once daily and discussed adverse effect profile.  Advised patient to check blood pressure at home regularly, and call the clinic with any adverse effects or persistently elevated blood pressure.     HLD   - Stable, maintained on fenofibrate 200mg QD.     Thyroid Nodule   - Thyroid US 12/2020 showed benign-appearing spongiform nodules and cysts <1cm. Last TSH wnl 11/2022.  Stable per repeat Thyroid U/S 11/2023.    Lung Nodules /MAC Infection  - s/p bronchoscopy with BAL 11/1/2023 positive for AFB likely due to MAC Infection.  Waxing and waning areas of  pulmonary nodularity with diffuse bronchial thickening which have an inflammatory appearance. Following with Dr.Haj Sosa from Pulmonology.  Call the clinic with any questions or concerns.       Reflux   - Symptoms managed with pantoprazole 40mg QD.  Last EGD 6/2020.     Weight Loss  - Continue taking Boost Nutrition supplements as well.       OA   - Takes naproxen 500mg QD as needed for pain with food and water. Advised that she can replace one of the daily doses with Tylenol to prevent adverse renal effects.  Try to limit as much as possible given HTN.     Asthma   - Maintained on Atrovent HFA 21mcg/act 1-2 puffs BID, and Montelukast 10 mg QD. Following with Dr.Haj Sosa from Pulmonology.     Pelvic Congestion Syndrome   - Followed with Dr. Del Real in GYN.     Raynaud's  - Continue to monitor symptoms. If worsening, consider PVR/EFRAIN.     Sinus Congestion   - Flonase is contraindicated due to glaucoma. Use nasal cleanser before using nasal spray at bed time.      Sleep Disturbance   - I have advised the patient start with Melatonin at 5 mg QHS and increase up to a maximum of 10 mg.     Health Maintenance   - Routine labs 11/2023.  Last Mammogram 7/2023. Cologuard negative 8/2021, due for repeat 2024 but patient would like to hold off for now.       Follow up in 3 months, call sooner if needed.        Scribe Attestation  By signing my name below, I, Summer Fry, Shashi   attest that this documentation has been prepared under the direction and in the presence of Norberto Sparks DO.   Summer Fry 05/23/24 10:06 AM

## 2024-07-16 DIAGNOSIS — M19.90 OSTEOARTHRITIS, UNSPECIFIED OSTEOARTHRITIS TYPE, UNSPECIFIED SITE: ICD-10-CM

## 2024-07-16 RX ORDER — NAPROXEN 500 MG/1
500 TABLET ORAL
Qty: 60 TABLET | Refills: 1 | Status: SHIPPED | OUTPATIENT
Start: 2024-07-16

## 2024-07-25 DIAGNOSIS — E78.5 HYPERLIPIDEMIA, UNSPECIFIED HYPERLIPIDEMIA TYPE: ICD-10-CM

## 2024-07-25 RX ORDER — FENOFIBRATE 200 MG/1
200 CAPSULE ORAL
Qty: 90 CAPSULE | Refills: 3 | Status: SHIPPED | OUTPATIENT
Start: 2024-07-25

## 2024-08-23 ENCOUNTER — APPOINTMENT (OUTPATIENT)
Dept: PRIMARY CARE | Facility: CLINIC | Age: 79
End: 2024-08-23
Payer: MEDICARE

## 2024-08-23 VITALS
BODY MASS INDEX: 21.01 KG/M2 | RESPIRATION RATE: 16 BRPM | HEART RATE: 87 BPM | TEMPERATURE: 97.3 F | OXYGEN SATURATION: 98 % | WEIGHT: 113 LBS | DIASTOLIC BLOOD PRESSURE: 80 MMHG | SYSTOLIC BLOOD PRESSURE: 128 MMHG

## 2024-08-23 DIAGNOSIS — E78.2 MIXED HYPERLIPIDEMIA: Primary | ICD-10-CM

## 2024-08-23 DIAGNOSIS — E04.1 THYROID NODULE: ICD-10-CM

## 2024-08-23 DIAGNOSIS — Z12.31 ENCOUNTER FOR SCREENING MAMMOGRAM FOR MALIGNANT NEOPLASM OF BREAST: ICD-10-CM

## 2024-08-23 PROCEDURE — 1157F ADVNC CARE PLAN IN RCRD: CPT | Performed by: INTERNAL MEDICINE

## 2024-08-23 PROCEDURE — 1159F MED LIST DOCD IN RCRD: CPT | Performed by: INTERNAL MEDICINE

## 2024-08-23 PROCEDURE — 1123F ACP DISCUSS/DSCN MKR DOCD: CPT | Performed by: INTERNAL MEDICINE

## 2024-08-23 PROCEDURE — G2211 COMPLEX E/M VISIT ADD ON: HCPCS | Performed by: INTERNAL MEDICINE

## 2024-08-23 PROCEDURE — 1158F ADVNC CARE PLAN TLK DOCD: CPT | Performed by: INTERNAL MEDICINE

## 2024-08-23 PROCEDURE — 99214 OFFICE O/P EST MOD 30 MIN: CPT | Performed by: INTERNAL MEDICINE

## 2024-08-23 PROCEDURE — 1160F RVW MEDS BY RX/DR IN RCRD: CPT | Performed by: INTERNAL MEDICINE

## 2024-08-23 PROCEDURE — 1036F TOBACCO NON-USER: CPT | Performed by: INTERNAL MEDICINE

## 2024-08-23 ASSESSMENT — ENCOUNTER SYMPTOMS
DIAPHORESIS: 0
COUGH: 1
SHORTNESS OF BREATH: 0

## 2024-08-23 NOTE — PROGRESS NOTES
"Patient here for a 3 month follow up    Subjective   Patient ID: Susu Lackey is a 79 y.o. female who presents for Follow-up.    The patient reports ongoing difficulty trying to gain weight despite her best efforts.      The patient mentions a constant \"prickly\" sensation on the plantar surface of the left foot, particularly on the lateral aspect when walking.  She has been wearing socks or slippers at home for additional support.  The patient suspects that she may have osteoarthritis in the foot.     The patient reports nasal and sinus congestion at night time with postnasal drip.  The mucus seems to gather on which ever side she is laying upon, and often results in coughing.  The patient continues to follow with  from Infectious Disease, and states that the condition is stable.  She states that there have no antibiotics to date as she hasn't experienced dyspnea, night sweats, or productive cough.  She will be following up in 6 months per the note.    The patient's sister passed away from Breast Cancer.      Review of Systems   Constitutional:  Negative for diaphoresis.   HENT:  Positive for congestion and postnasal drip.    Respiratory:  Positive for cough. Negative for shortness of breath.    Neurological:         Positive for paresthesia in on the plantar surface of the left foot.       Objective   Physical Exam  Constitutional:       Appearance: Normal appearance.   Neck:      Vascular: No carotid bruit.   Cardiovascular:      Rate and Rhythm: Normal rate and regular rhythm.      Heart sounds: Normal heart sounds.   Pulmonary:      Effort: Pulmonary effort is normal.      Breath sounds: Normal breath sounds.   Abdominal:      General: Bowel sounds are normal.      Palpations: Abdomen is soft.      Tenderness: There is no abdominal tenderness.   Skin:     General: Skin is warm and dry.   Neurological:      General: No focal deficit present.      Mental Status: She is alert and oriented to person, " place, and time. Mental status is at baseline.   Psychiatric:         Mood and Affect: Mood normal.         Behavior: Behavior normal.       Assessment/Plan   Problem List Items Addressed This Visit             ICD-10-CM    Hyperlipidemia - Primary E78.5    Relevant Orders    CBC and Auto Differential    Comprehensive metabolic panel    Lipid panel    Tsh With Reflex To Free T4 If Abnormal     Other Visit Diagnoses         Codes    Thyroid nodule     E04.1    Relevant Orders    US thyroid    Encounter for screening mammogram for malignant neoplasm of breast     Z12.31    Relevant Orders    BI mammo bilateral screening tomosynthesis            IMPRESSION/PLAN:       Thyroid Nodule   - Thyroid US 12/2020 showed benign-appearing spongiform nodules and cysts <1cm. Last TSH wnl 11/2022.  Stable per repeat Thyroid U/S 11/2023.  Ordered repeat for 2024.    Sinus Congestion   - Flonase is contraindicated due to glaucoma. Recommended patient mention these symptoms in next appointment with Infectious Disease, and see if antibiotics are warranted.     HTN  - /80 in the office today, with similar on repeat.  Maintained with amlodipine 2.5mg once daily and discussed adverse effect profile.  Advised patient to check blood pressure at home regularly, and call the clinic with any adverse effects or persistently elevated blood pressure.     HLD   - Stable, maintained on fenofibrate 200mg QD.     Sleep Disturbance   - I have advised the patient start with Melatonin at 5 mg QHS and increase up to a maximum of 10 mg.    MAC w/ dyspnea  - Following with  from Infectious Disease.     Asthma   - Maintained on Atrovent HFA 21mcg/act 1-2 puffs BID, and Montelukast 10 mg QD. Following with Dr.Haj Sosa from Pulmonology    Lung Nodules /MAC Infection  - s/p bronchoscopy with BAL 11/1/2023 positive for AFB likely due to MAC Infection.  Waxing and waning areas of pulmonary nodularity with diffuse bronchial thickening which  have an inflammatory appearance. Following with Dr.Haj Sosa from Pulmonology.  Call the clinic with any questions or concerns.       Reflux   - Symptoms managed with pantoprazole 40mg QD.  Last EGD 6/2020.     Pelvic Congestion Syndrome   - Followed with Dr. Del Real in GYN.      Raynaud's  - Continue to monitor symptoms. If worsening, consider PVR/EFRAIN.    OA   - Takes naproxen 500mg QD as needed for pain with food and water. Advised that she can replace one of the daily doses with Tylenol to prevent adverse renal effects.  Try to limit as much as possible given HTN.     Weight Loss  - Continue taking Boost Nutrition supplements as well.    Health Maintenance   - Routine labs ordered including CBC, CMP, and a lipid panel to be completed in the fasting state.  Added TSH.  Last Mammogram 7/2023, ordered repeat for 2024. Cologuard negative 8/2021, due for repeat 2024 but patient would like to hold off for now.       Follow up in 6 months, call sooner if needed.        Scribe Attestation  By signing my name below, I, Shashi Preslye   attest that this documentation has been prepared under the direction and in the presence of Norberto Sparks DO.   Summer Fry 08/23/24 11:12 AM

## 2024-10-10 DIAGNOSIS — R05.3 CHRONIC COUGH: ICD-10-CM

## 2024-10-10 DIAGNOSIS — J45.20 INTERMITTENT ASTHMA, UNSPECIFIED ASTHMA SEVERITY, UNSPECIFIED WHETHER COMPLICATED (HHS-HCC): ICD-10-CM

## 2024-10-10 DIAGNOSIS — A31.0 MYCOBACTERIUM AVIUM COMPLEX (MULTI): Primary | ICD-10-CM

## 2024-10-10 RX ORDER — TIOTROPIUM BROMIDE INHALATION SPRAY 1.56 UG/1
2 SPRAY, METERED RESPIRATORY (INHALATION) DAILY
Qty: 4 G | Refills: 3 | Status: SHIPPED | OUTPATIENT
Start: 2024-10-10 | End: 2024-11-09

## 2024-11-12 ENCOUNTER — HOSPITAL ENCOUNTER (OUTPATIENT)
Dept: RADIOLOGY | Facility: CLINIC | Age: 79
Discharge: HOME | End: 2024-11-12
Payer: MEDICARE

## 2024-11-12 VITALS — WEIGHT: 110 LBS | HEIGHT: 61 IN | BODY MASS INDEX: 20.77 KG/M2

## 2024-11-12 DIAGNOSIS — Z12.31 ENCOUNTER FOR SCREENING MAMMOGRAM FOR MALIGNANT NEOPLASM OF BREAST: ICD-10-CM

## 2024-11-12 DIAGNOSIS — E04.1 THYROID NODULE: ICD-10-CM

## 2024-11-12 PROCEDURE — 77063 BREAST TOMOSYNTHESIS BI: CPT | Performed by: RADIOLOGY

## 2024-11-12 PROCEDURE — 77067 SCR MAMMO BI INCL CAD: CPT

## 2024-11-12 PROCEDURE — 77067 SCR MAMMO BI INCL CAD: CPT | Performed by: RADIOLOGY

## 2024-11-12 PROCEDURE — 76536 US EXAM OF HEAD AND NECK: CPT

## 2024-11-12 PROCEDURE — 76536 US EXAM OF HEAD AND NECK: CPT | Performed by: STUDENT IN AN ORGANIZED HEALTH CARE EDUCATION/TRAINING PROGRAM

## 2024-11-13 DIAGNOSIS — I10 PRIMARY HYPERTENSION: ICD-10-CM

## 2024-11-13 DIAGNOSIS — M19.90 OSTEOARTHRITIS, UNSPECIFIED OSTEOARTHRITIS TYPE, UNSPECIFIED SITE: ICD-10-CM

## 2024-11-13 RX ORDER — AMLODIPINE BESYLATE 2.5 MG/1
2.5 TABLET ORAL DAILY
Qty: 30 TABLET | Refills: 5 | Status: SHIPPED | OUTPATIENT
Start: 2024-11-13 | End: 2025-05-12

## 2024-11-13 RX ORDER — NAPROXEN 500 MG/1
500 TABLET ORAL
Qty: 60 TABLET | Refills: 1 | Status: SHIPPED | OUTPATIENT
Start: 2024-11-13

## 2024-11-13 NOTE — TELEPHONE ENCOUNTER
Patient needs a refill on Naproxin and Amlodipine/ Patient is almost out. Patient uses [facility] on [address] in [place].

## 2024-11-25 ENCOUNTER — LAB (OUTPATIENT)
Dept: LAB | Facility: LAB | Age: 79
End: 2024-11-25
Payer: MEDICARE

## 2024-11-25 ENCOUNTER — HOSPITAL ENCOUNTER (OUTPATIENT)
Dept: RADIOLOGY | Facility: CLINIC | Age: 79
Discharge: HOME | End: 2024-11-25
Payer: MEDICARE

## 2024-11-25 DIAGNOSIS — E78.2 MIXED HYPERLIPIDEMIA: ICD-10-CM

## 2024-11-25 DIAGNOSIS — R05.3 CHRONIC COUGH: ICD-10-CM

## 2024-11-25 DIAGNOSIS — A31.0 MYCOBACTERIUM AVIUM COMPLEX (MULTI): ICD-10-CM

## 2024-11-25 LAB
ALBUMIN SERPL BCP-MCNC: 4.3 G/DL (ref 3.4–5)
ALP SERPL-CCNC: 99 U/L (ref 33–136)
ALT SERPL W P-5'-P-CCNC: 13 U/L (ref 7–45)
ANION GAP SERPL CALC-SCNC: 11 MMOL/L (ref 10–20)
AST SERPL W P-5'-P-CCNC: 28 U/L (ref 9–39)
BASOPHILS # BLD AUTO: 0.04 X10*3/UL (ref 0–0.1)
BASOPHILS NFR BLD AUTO: 0.9 %
BILIRUB SERPL-MCNC: 1 MG/DL (ref 0–1.2)
BUN SERPL-MCNC: 30 MG/DL (ref 6–23)
CALCIUM SERPL-MCNC: 10.1 MG/DL (ref 8.6–10.3)
CHLORIDE SERPL-SCNC: 101 MMOL/L (ref 98–107)
CHOLEST SERPL-MCNC: 146 MG/DL (ref 0–199)
CHOLESTEROL/HDL RATIO: 3.5
CO2 SERPL-SCNC: 32 MMOL/L (ref 21–32)
CREAT SERPL-MCNC: 1.03 MG/DL (ref 0.5–1.05)
EGFRCR SERPLBLD CKD-EPI 2021: 55 ML/MIN/1.73M*2
EOSINOPHIL # BLD AUTO: 0.18 X10*3/UL (ref 0–0.4)
EOSINOPHIL NFR BLD AUTO: 3.9 %
ERYTHROCYTE [DISTWIDTH] IN BLOOD BY AUTOMATED COUNT: 13.3 % (ref 11.5–14.5)
GLUCOSE SERPL-MCNC: 89 MG/DL (ref 74–99)
HCT VFR BLD AUTO: 43.3 % (ref 36–46)
HDLC SERPL-MCNC: 41.4 MG/DL
HGB BLD-MCNC: 14.4 G/DL (ref 12–16)
IMM GRANULOCYTES # BLD AUTO: 0.01 X10*3/UL (ref 0–0.5)
IMM GRANULOCYTES NFR BLD AUTO: 0.2 % (ref 0–0.9)
LDLC SERPL CALC-MCNC: 74 MG/DL
LYMPHOCYTES # BLD AUTO: 1.05 X10*3/UL (ref 0.8–3)
LYMPHOCYTES NFR BLD AUTO: 22.7 %
MCH RBC QN AUTO: 32.8 PG (ref 26–34)
MCHC RBC AUTO-ENTMCNC: 33.3 G/DL (ref 32–36)
MCV RBC AUTO: 99 FL (ref 80–100)
MONOCYTES # BLD AUTO: 0.71 X10*3/UL (ref 0.05–0.8)
MONOCYTES NFR BLD AUTO: 15.3 %
NEUTROPHILS # BLD AUTO: 2.64 X10*3/UL (ref 1.6–5.5)
NEUTROPHILS NFR BLD AUTO: 57 %
NON HDL CHOLESTEROL: 105 MG/DL (ref 0–149)
NRBC BLD-RTO: 0 /100 WBCS (ref 0–0)
PLATELET # BLD AUTO: 273 X10*3/UL (ref 150–450)
POTASSIUM SERPL-SCNC: 4.9 MMOL/L (ref 3.5–5.3)
PROT SERPL-MCNC: 6.9 G/DL (ref 6.4–8.2)
RBC # BLD AUTO: 4.39 X10*6/UL (ref 4–5.2)
SODIUM SERPL-SCNC: 139 MMOL/L (ref 136–145)
TRIGL SERPL-MCNC: 152 MG/DL (ref 0–149)
TSH SERPL-ACNC: 2.84 MIU/L (ref 0.44–3.98)
VLDL: 30 MG/DL (ref 0–40)
WBC # BLD AUTO: 4.6 X10*3/UL (ref 4.4–11.3)

## 2024-11-25 PROCEDURE — 80053 COMPREHEN METABOLIC PANEL: CPT

## 2024-11-25 PROCEDURE — 71250 CT THORAX DX C-: CPT

## 2024-11-25 PROCEDURE — 71250 CT THORAX DX C-: CPT | Performed by: RADIOLOGY

## 2024-11-25 PROCEDURE — 80061 LIPID PANEL: CPT

## 2024-11-25 PROCEDURE — 84443 ASSAY THYROID STIM HORMONE: CPT

## 2024-11-25 PROCEDURE — 85025 COMPLETE CBC W/AUTO DIFF WBC: CPT

## 2024-11-25 PROCEDURE — 36415 COLL VENOUS BLD VENIPUNCTURE: CPT

## 2024-12-03 ENCOUNTER — TELEPHONE (OUTPATIENT)
Dept: PULMONOLOGY | Facility: CLINIC | Age: 79
End: 2024-12-03
Payer: MEDICARE

## 2024-12-03 NOTE — TELEPHONE ENCOUNTER
----- Message from Alicia Linares sent at 12/3/2024 12:42 PM EST -----  Regarding: appt  This patient has not been seen since May, needs followup from testing  Alicia

## 2024-12-11 ENCOUNTER — APPOINTMENT (OUTPATIENT)
Dept: DERMATOLOGY | Facility: CLINIC | Age: 79
End: 2024-12-11
Payer: MEDICARE

## 2024-12-11 DIAGNOSIS — L81.4 LENTIGO: ICD-10-CM

## 2024-12-11 DIAGNOSIS — Z12.83 ENCOUNTER FOR SCREENING FOR MALIGNANT NEOPLASM OF SKIN: ICD-10-CM

## 2024-12-11 DIAGNOSIS — D18.01 HEMANGIOMA OF SKIN: ICD-10-CM

## 2024-12-11 DIAGNOSIS — L82.1 SEBORRHEIC KERATOSIS: ICD-10-CM

## 2024-12-11 DIAGNOSIS — L57.8 DIFFUSE PHOTODAMAGE OF SKIN: ICD-10-CM

## 2024-12-11 DIAGNOSIS — L57.0 ACTINIC KERATOSIS: ICD-10-CM

## 2024-12-11 DIAGNOSIS — D22.9 MELANOCYTIC NEVUS, UNSPECIFIED LOCATION: Primary | ICD-10-CM

## 2024-12-11 PROCEDURE — 1157F ADVNC CARE PLAN IN RCRD: CPT | Performed by: DERMATOLOGY

## 2024-12-11 PROCEDURE — 17000 DESTRUCT PREMALG LESION: CPT | Performed by: STUDENT IN AN ORGANIZED HEALTH CARE EDUCATION/TRAINING PROGRAM

## 2024-12-11 PROCEDURE — 99213 OFFICE O/P EST LOW 20 MIN: CPT | Performed by: DERMATOLOGY

## 2024-12-11 PROCEDURE — 1159F MED LIST DOCD IN RCRD: CPT | Performed by: DERMATOLOGY

## 2024-12-11 NOTE — PROGRESS NOTES
Subjective     Susu Lackey is a 79 y.o. female who presents for the following: Skin Check (Here for Annual skin cancer screening to r/o development of skin cancer and lesions/No h/o Skin Cancer/H/o AK's/No spots of concern for today).     Skin Cancer Screening  She has a history of heavy sun exposure. She is in the sun occasionally. She uses sunscreen rarely. She reports no skin symptoms. Her moles are not changing.    Spots that concern her: none          Review of Systems:  No other skin or systemic complaints other than what is documented elsewhere in the note.    The following portions of the chart were reviewed this encounter and updated as appropriate:       Skin Cancer History  No skin cancer on file.    Specialty Problems    None    Past Medical History:  Susu Lackey  has a past medical history of Abnormal findings on diagnostic imaging of other specified body structures (11/22/2017), Encounter for screening mammogram for malignant neoplasm of breast, Epigastric pain (10/08/2019), Gastro-esophageal reflux disease with esophagitis, without bleeding (12/13/2019), Nonscarring hair loss, unspecified (12/13/2019), Other acute recurrent sinusitis (11/07/2017), Other conditions influencing health status (12/13/2019), Other symptoms and signs involving general sensations and perceptions (09/28/2018), Personal history of other diseases of the nervous system and sense organs, Personal history of other diseases of the respiratory system (10/08/2014), Personal history of other diseases of the respiratory system (11/14/2018), Personal history of other diseases of the respiratory system (09/28/2018), Personal history of other diseases of the respiratory system, Personal history of other medical treatment (05/08/2017), Personal history of other medical treatment (05/15/2019), Personal history of other specified conditions (09/26/2018), Personal history of other specified conditions (05/24/2018), Personal history of  other specified conditions (08/20/2020), and Vulvar cyst (07/17/2014).    Past Surgical History:  Susu Lackey  has a past surgical history that includes Foot surgery (10/29/2012); Colonoscopy (10/29/2012); Tonsillectomy (11/12/2012); and Other surgical history (01/11/2014).    Family History:  Patient family history includes Breast cancer in her sister; Hemolytic uremic syndrome in her father.       Objective   Well appearing patient in no apparent distress; mood and affect are within normal limits.    A full examination was performed including scalp, head, eyes, ears, nose, lips, neck, chest, axillae, abdomen, back, buttocks, bilateral upper extremities, bilateral lower extremities, hands, feet, fingers, toes, fingernails, and toenails. All findings within normal limits unless otherwise noted below.    Assessment/Plan   1. Actinic keratosis  left malar cheek  - gritty pink macules      - reviewed precancerous nature of these lesions and pt amenable to cryotherapy treatment today       Destr of lesion - left malar cheek  Complexity: simple    Destruction method: cryotherapy    Informed consent: discussed and consent obtained    Lesion destroyed using liquid nitrogen: Yes    Region frozen until ice ball extended beyond lesion: Yes    Cryotherapy cycles:  2  Outcome: patient tolerated procedure well with no complications    Post-procedure details: wound care instructions given      Related Procedures  Follow Up In Dermatology - Established Patient    2. Melanocytic nevus, unspecified location  All nevi were symmetric brown macules without atypia on dermoscopy.     The ABCDEs of melanoma and warning signs of non-melanoma skin cancer were discussed with patient and patient expressed understanding. Sun protection and use of at least SPF 30 discussed with patient. Pt instructed to reapply every 2 hours.     3. Lentigo  Scattered tan macules in sun-exposed areas.    The ABCDEs of melanoma and warning signs of  non-melanoma skin cancer were discussed with patient and patient expressed understanding. Sun protection and use of at least SPF 30 discussed with patient. Pt instructed to reapply every 2 hours.     4. Seborrheic keratosis  Stuck on verrucous, tan-brown papules and plaques.      This is a benign finding and requires no treatment.      5. Hemangioma of skin  Scattered cherry-red papule(s).    This is a benign finding and requires no treatment.      6. Diffuse photodamage of skin  Diffuse photodamage with actinic changes with telangiectasia and mottled pigmentation in sun-exposed areas.     - Sun protective behavior reviewed and encouraged including the use of over-the-counter sunscreen with SPF30+ daily (reapply every 1.5 hours when outdoors), UPF clothing, broad rimmed hats, sunglasses, and avoidance of midday sun. Home skin monitoring encouraged and how to monitor for skin cancer (changing or new moles, new rapidly growing or non-healing lesions) reviewed.         F/u in 1yr  Pt seen by Dr. Chelsy Greene, PGY-2    I saw and evaluated the patient. I personally obtained the key and critical portions of the history and physical exam or was physically present for key and critical portions performed by the student/resident. I reviewed the student/resident's documentation and discussed the patient with the student/resident. I was present for the entirety of any procedure(s). I agree with the student/resident's medical decision making as documented in the note.

## 2024-12-19 DIAGNOSIS — R91.1 PULMONARY NODULE: ICD-10-CM

## 2024-12-19 DIAGNOSIS — A31.0 MYCOBACTERIUM AVIUM COMPLEX (MULTI): Primary | ICD-10-CM

## 2024-12-19 NOTE — PROGRESS NOTES
Patient has a 9 mm nodule on RUL from 11/25/2024 - will need to repeat in 3 months. Patient notifeid via [employer]

## 2025-01-03 ENCOUNTER — TELEPHONE (OUTPATIENT)
Facility: CLINIC | Age: 80
End: 2025-01-03
Payer: MEDICARE

## 2025-01-03 NOTE — TELEPHONE ENCOUNTER
Spoke with patient.    Stated she seen .    He went over results with her.    Would like to know if you still want to see her.    Thanks

## 2025-01-03 NOTE — TELEPHONE ENCOUNTER
----- Message from Alicia Linares sent at 12/19/2024 11:39 AM EST -----  Regarding: Followup  Patient has not had a followup after her CT chest. Please call to schedule a followup after next CT chest in jan .  tHanks  Alicia

## 2025-01-15 NOTE — TELEPHONE ENCOUNTER
Spoke with patient and stated that she had a CT last month. She doesn't normally do these this close together and feels like it should be every 6 months and then follow up  afterwards. Please advise

## 2025-02-18 ENCOUNTER — HOSPITAL ENCOUNTER (OUTPATIENT)
Dept: RADIOLOGY | Facility: CLINIC | Age: 80
Discharge: HOME | End: 2025-02-18
Payer: MEDICARE

## 2025-02-18 DIAGNOSIS — R91.1 PULMONARY NODULE: ICD-10-CM

## 2025-02-18 PROCEDURE — 71250 CT THORAX DX C-: CPT

## 2025-02-18 PROCEDURE — 71250 CT THORAX DX C-: CPT | Performed by: RADIOLOGY

## 2025-02-25 ENCOUNTER — APPOINTMENT (OUTPATIENT)
Dept: PRIMARY CARE | Facility: CLINIC | Age: 80
End: 2025-02-25
Payer: MEDICARE

## 2025-02-25 VITALS
OXYGEN SATURATION: 97 % | WEIGHT: 116 LBS | HEIGHT: 62 IN | BODY MASS INDEX: 21.35 KG/M2 | RESPIRATION RATE: 16 BRPM | DIASTOLIC BLOOD PRESSURE: 70 MMHG | HEART RATE: 62 BPM | SYSTOLIC BLOOD PRESSURE: 128 MMHG | TEMPERATURE: 98 F

## 2025-02-25 DIAGNOSIS — E04.1 THYROID NODULE: ICD-10-CM

## 2025-02-25 DIAGNOSIS — R91.1 LUNG NODULE: ICD-10-CM

## 2025-02-25 DIAGNOSIS — J47.9 BRONCHIECTASIS WITHOUT COMPLICATION (MULTI): Primary | ICD-10-CM

## 2025-02-25 DIAGNOSIS — E78.2 MIXED HYPERLIPIDEMIA: ICD-10-CM

## 2025-02-25 PROCEDURE — G2211 COMPLEX E/M VISIT ADD ON: HCPCS | Performed by: INTERNAL MEDICINE

## 2025-02-25 PROCEDURE — 99214 OFFICE O/P EST MOD 30 MIN: CPT | Performed by: INTERNAL MEDICINE

## 2025-02-25 PROCEDURE — 99397 PER PM REEVAL EST PAT 65+ YR: CPT | Performed by: INTERNAL MEDICINE

## 2025-02-25 PROCEDURE — 1036F TOBACCO NON-USER: CPT | Performed by: INTERNAL MEDICINE

## 2025-02-25 PROCEDURE — 1159F MED LIST DOCD IN RCRD: CPT | Performed by: INTERNAL MEDICINE

## 2025-02-25 PROCEDURE — G0439 PPPS, SUBSEQ VISIT: HCPCS | Performed by: INTERNAL MEDICINE

## 2025-02-25 PROCEDURE — 1157F ADVNC CARE PLAN IN RCRD: CPT | Performed by: INTERNAL MEDICINE

## 2025-02-25 PROCEDURE — 1170F FXNL STATUS ASSESSED: CPT | Performed by: INTERNAL MEDICINE

## 2025-02-25 PROCEDURE — 1160F RVW MEDS BY RX/DR IN RCRD: CPT | Performed by: INTERNAL MEDICINE

## 2025-02-25 ASSESSMENT — ACTIVITIES OF DAILY LIVING (ADL)
MANAGING_FINANCES: INDEPENDENT
DRESSING: INDEPENDENT
TAKING_MEDICATION: INDEPENDENT
GROCERY_SHOPPING: INDEPENDENT
BATHING: INDEPENDENT
DOING_HOUSEWORK: INDEPENDENT

## 2025-02-25 ASSESSMENT — ENCOUNTER SYMPTOMS
NUMBNESS: 1
DIAPHORESIS: 0

## 2025-02-25 NOTE — PROGRESS NOTES
Patient here for a medicare wellness visit, physical and follow up    Subjective   Patient ID: Susu Lackey is a 79 y.o. female who presents for Follow-up, Medicare Annual Wellness Visit Subsequent, and Annual Exam.    The patient reports crepitations in the left shoulder when exercising with resistance banding.  She notes a similar sensation in the jaw which she suspects is due to osteoarthritis.  The patient is maintained with naproxen 500mg up to twice daily as needed in addition to pantoprazole 40mg once daily for protection of the gastric mucosa.      The patient continues to follow with  from Infectious Disease for a history of MAC, and states that her condition is stable.  She denies any increase of dyspnea above baseline, night sweats, or hemoptysis. The patient smoked for a short time when she was a teenager before stopping.    The patient notes occasional paresthesia in the left lower extremity, particularly at night time when she is sitting and watching television.  She states that the symptoms are tolerable to date.        Review of Systems   Constitutional:  Negative for diaphoresis.   Respiratory:          Negative for hemoptysis.   Musculoskeletal:         Positive for crepitations in the left shoulder and jaw.   Neurological:  Positive for numbness.   All other systems reviewed and are negative.    Objective   Physical Exam  Constitutional:       Appearance: Normal appearance.   Neck:      Vascular: No carotid bruit.   Cardiovascular:      Rate and Rhythm: Normal rate and regular rhythm.      Heart sounds: Normal heart sounds.   Pulmonary:      Effort: Pulmonary effort is normal.      Breath sounds: Normal breath sounds.   Abdominal:      General: Bowel sounds are normal.      Palpations: Abdomen is soft.      Tenderness: There is no abdominal tenderness.   Skin:     General: Skin is warm and dry.   Neurological:      General: No focal deficit present.      Mental Status: She is alert and  oriented to person, place, and time. Mental status is at baseline.   Psychiatric:         Mood and Affect: Mood normal.         Behavior: Behavior normal.         Assessment/Plan   Problem List Items Addressed This Visit    None      Medicare Wellness Examination Done  -  Discussed healthy diet and regular exercise.    -  Physical exam overall unremarkable. Immunizations reviewed and updated accordingly. Healthy lifestyle choices discussed (tobacco avoidance, appropriate alcohol use, avoidance of illicit substances).   -  Patient is wearing seatbelt.   -  Screening lab work ordered as indicated.    -  Age appropriate screening tests reviewed with patient.       IMPRESSION/PLAN:      Left Lower Extremity Paresthesia  - Suspect nerve impingement per history.  Mild symptoms.  Monitor for now.  Call the clinic if symptoms persist or worsen, and will order EMG and Nerve Conduction studies.    HTN  - /70 in the office today, with similar on repeat.  Maintained with amlodipine 2.5mg once daily and discussed adverse effect profile.  Advised patient to check blood pressure at home regularly, and call the clinic with any adverse effects or persistently elevated blood pressure.     HLD   - Stable, maintained on fenofibrate 200mg QD.     Sleep Disturbance   - I have advised the patient start with Melatonin at 5 mg QHS and increase up to a maximum of 10 mg.     Sinus Congestion   - Flonase is contraindicated due to glaucoma. Recommended patient mention these symptoms in next appointment with Infectious Disease, and see if antibiotics are warranted.     Thyroid Nodule   - Thyroid US 12/2020 showed benign-appearing spongiform nodules and cysts <1cm. Last TSH wnl 11/2022.  Stable per repeat Thyroid U/S 11/2023, 11/2024.    MAC w/ dyspnea  - Following with  from Infectious Disease.      Asthma   - Maintained on Atrovent HFA 21mcg/act 1-2 puffs BID, and Montelukast 10 mg QD. Following with Dr.Haj Sosa from  Pulmonology     Lung Nodules /MAC Infection  - s/p bronchoscopy with BAL 11/1/2023 positive for AFB likely due to MAC Infection.  Waxing and waning areas of pulmonary nodularity with diffuse bronchial thickening which have an inflammatory appearance. Repeat CT 2/2025 showed Waxing and waning areas of mucous plugging as well as bronchial thickening seen scattered throughout the lungs. Questioned left apical lobe nodule is not changed. 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  Following with Dr.Haj Sosa from Pulmonology.  Call the clinic with any questions or concerns.       Reflux   - Symptoms managed with pantoprazole 40mg QD.  Last EGD 6/2020.     Pelvic Congestion Syndrome   - Followed with Dr. Del Real in GYN.      Raynaud's  - Continue to monitor symptoms. If worsening, consider PVR/EFRAIN.     OA   - Takes naproxen 500mg QD as needed for pain with food and water. Advised that she can replace one of the daily doses with Tylenol to prevent adverse renal effects.  Try to limit as much as possible given HTN.     Weight Loss  - Continue taking Boost Nutrition supplements as well.     Health Maintenance   - Routine labs 11/2024.  Last Mammogram 11/2024. Cologuard negative 8/2021, due for repeat 2024 but patient would like to hold off for now.       Follow up in 6 months, call sooner if needed.        Scribe Attestation  By signing my name below, I, Shashi Presley   attest that this documentation has been prepared under the direction and in the presence of Norberto Sparks DO.   Summer Fry 02/25/25 11:03 AM

## 2025-03-03 ENCOUNTER — APPOINTMENT (OUTPATIENT)
Facility: CLINIC | Age: 80
End: 2025-03-03
Payer: MEDICARE

## 2025-03-03 DIAGNOSIS — M19.90 OSTEOARTHRITIS, UNSPECIFIED OSTEOARTHRITIS TYPE, UNSPECIFIED SITE: ICD-10-CM

## 2025-03-03 RX ORDER — NAPROXEN 500 MG/1
500 TABLET ORAL
Qty: 60 TABLET | Refills: 1 | Status: SHIPPED | OUTPATIENT
Start: 2025-03-03

## 2025-03-11 ASSESSMENT — ENCOUNTER SYMPTOMS: COUGH: 1

## 2025-03-11 NOTE — PROGRESS NOTES
Patient: Susu Lackey    [id number]  : 1945 -- AGE 79 y.o.    Provider: Alicia Linares CNP     Location [facility]   Service Date: 3/20/2025       Department of Medicine  Division of Pulmonary, Critical Care, and Sleep Medicine         [facility]  Follow Up Visit Note    HISTORY OF PRESENT ILLNESS     HISTORY OF PRESENT ILLNESS   Susu Lackey is a 79 y.o. female who presents to a [facility] for a follow up visit with concerns of MAC. . I have independently interviewed and examined the patient in the office and reviewed available records.      DATE OF LAST VISIT: 2023      Bronchoscope     History Of Present Illness  Mrs. Lackey is a 77-year-old female with past medical history significant for intermittent asthma who presented to the office today regarding multiple pulmonary nodules noted on a CT scan of chest.      The patient has been following with primary care physician and GI clinics for unintentional weight loss over the past 2 years. During work-up CT scan of chest revealed waxing and waning centrilobular nodular infiltrates that was concerning for possible chronic respiratory infection. The patient is a lifetime non-smoker. She denies high risk occupational exposures, was a  until she retired. She is independent in her daily activities and denies limiting shortness of breath. She reports occasional cough and chest congestion. She denies recurrent fevers or night sweats. She was started on boost cans by GI and since then she reports she has been gaining weight back again. Most recent CT scan of chest in 2020 is reviewed today which revealed again waxing and waning centrilobular nodular infiltrate.     No past history of connective tissue disease  No family history of chronic lung disease reported.     Follow up 2021:  No acute respiratory symptoms today. She did not have productive  cough to submit sputum after last visit. She denies recurrent fevers or night sweats. She has gained about 6 pounds. No acute chest pain or hemoptysis. She reports occasional wheezing with weather changes from asthma. She has not been using any bronchodilators. She has history of glaucoma for which she is following up with ophthalmology on regular basis. She reports arthritis in her hands and spine that is attributed to osteoarthritis in the past.     Follow-up 7/29/21:  No acute respiratory symptoms again today. Susu reports occasional cough that is mostly dry. She did not submit any sputum for cultures. She denies recurrent fevers or night sweats. Her weight has been stable. Using albuterol only as needed. Repeat CT scan of chest revealed wax and wane pulmonary nodules. Results are discussed with patient in details today.     Follow up 3/15/2022:  Susu stable respiratory status since last visit. Weight has been stable. No recurrent fevers or night sweats. She has occasional cough mainly in the afternoon with postnasal drainage. No purulent sputum production or hemoptysis. She is up-to-date with vaccinations.     Follow-up 4/21/2022:  (phone visit)  Ssuu reports a stable respiratory status. No acute fevers, recurrent chills or night sweats. Weight has been stable. Cough is mostly dry and is not persistent. Repeated CT scan of chest is reviewed today which revealed overall stability in the centrilobular nodularity and mild bronchiectasis. We discussed today that the stability of CT scan over the past 2-3 years is reassuring. Etiology could be related to chronic respiratory infection though she does not have any signs/symptoms of active infection right now. Immunoglobulin levels are within normal range. We discussed today that bronchoscopy with bronchoalveolar lavage would be the next step to further evaluate for chronic infection though given that her symptoms are stable now we could continue observation as  well. Bronchoscopy is deferred and we will follow-up with her in the next 4-6 months or sooner if necessary. She is informed to call my office should she develop new respiratory symptoms.     Please excuse any misspellings or unintended errors related to the Dragon speech recognition software used to dictate this note.      Follow-up 10/21/2022:  No acute respiratory symptoms today or interval history of recurrent fevers, chills or night sweats. No weight loss. Denies any hemoptysis. She reports intermittent cough that is minimally productive. No change in respiratory status over the past year.     Follow-up 2/14/2023:  Susu reports stable respiratory status since last visit. Most recent CT scan of chest is reviewed today which revealed stable pulmonary nodules when compared to his previous scan. She denies recurrent fevers, persistent cough or sputum production. No night sweats. No change in her weight over the past year. She is able to conduct her daily activities without respiratory limitation.      Follow up 11/1/2023:  Most recent CT scan of chest showed waxing and waning areas of pulmonary nodularity with diffuse bronchial thickening. Plan is for Bronchoscopy with BAL and possible Tbbx today.     Current Yidmzmo36/7/2023    On today's visit, the patient reports feeling good until this past Sunday. Feeling worse in the afternoo. C/o feeling congestion in the chest towards evening.  C/o dry cough.  C/o AVINA after strenous exercise or walking fast. Denies fever /chills chest pain or GERD.  Denies night time cough. NO ER visits. Declines flu vaccine.  She is not using inhaler as she does not need    Current History 3/14/2024    On today's visit, the patient reports she said she was seen by ID (Dr Palacio) in Jan, per her understanding since she is asymptomatic she does not need treated and planning to have a CT chest in April-May to re-evaluate if improving or worsening and will decide on treatment then.      C/o  nasal drainage and post nasal drip. Using flonase   She has glaucoma and is concerned with Breo  She had the flu last week  Denies cough, wheeze, Fevers/chills  She is more active as she is outside walking. She can walk 15-20min, Denies AVINA   Denies chest pain or tightness  GERD denies     She is scheduled for CT in April or May   No night time cough    3/20/2025    On today's visit, the patient reports every afternoon she starts wheezing and coughing. She has nonproductive cough. She uses her inhaler - helps and breaks up the secretions. She walked far to come into office but felt a little winded, but seldom feels dyspnea. She is vacuums her home without difficulty.  Denies fever chills or chest tightness,   She has seen Dr Palacio and she is asymptomatic and CT chest is stable - no treatment needed    ACT 21       REVIEW OF SYSTEMS     REVIEW OF SYSTEMS  Review of Systems   Respiratory:  Positive for cough.    Cardiovascular:  Positive for leg swelling.   All other systems reviewed and are negative.        ALLERGIES AND MEDICATIONS     ALLERGIES  Allergies   Allergen Reactions    Dog Dander Other    Feathers Other    House Dust Mite Other       MEDICATIONS  Current Outpatient Medications   Medication Sig Dispense Refill    amLODIPine (Norvasc) 2.5 mg tablet Take 1 tablet (2.5 mg) by mouth once daily. 30 tablet 5    Combigan 0.2-0.5 % ophthalmic solution Administer 1 drop into both eyes once daily.      fenofibrate micronized (Lofibra) 200 mg capsule Take 1 capsule (200 mg) by mouth once daily with breakfast. 90 capsule 3    ipratropium (Atrovent) 21 mcg (0.03 %) nasal spray Administer 1 spray into each nostril every 12 hours. 30 mL 2    latanoprost (Xalatan) 0.005 % ophthalmic solution Administer 1 drop into both eyes once daily.      montelukast (Singulair) 10 mg tablet Take 1 tablet (10 mg) by mouth once daily. as directed 90 tablet 3    multivitamin with iron (pediatric multivitamin-iron) tablet chewable split  tablet Take 1 half tablet by mouth once daily.      naproxen (Naprosyn) 500 mg tablet Take 1 tablet (500 mg) by mouth 2 times daily (morning and late afternoon). 60 tablet 1    pantoprazole (ProtoNix) 40 mg EC tablet Take 1 tablet (40 mg) by mouth once daily. 90 tablet 3    tiotropium (Spiriva Respimat) 1.25 mcg/actuation inhaler Inhale 2 puffs once daily. 4 g 3     No current facility-administered medications for this visit.         PAST HISTORY     PAST MEDICAL HISTORY  She  has a past medical history of Abnormal findings on diagnostic imaging of other specified body structures (11/22/2017), Encounter for screening mammogram for malignant neoplasm of breast, Epigastric pain (10/08/2019), Gastro-esophageal reflux disease with esophagitis, without bleeding (12/13/2019), Nonscarring hair loss, unspecified (12/13/2019), Other acute recurrent sinusitis (11/07/2017), Other conditions influencing health status (12/13/2019), Other symptoms and signs involving general sensations and perceptions (09/28/2018), Personal history of other diseases of the nervous system and sense organs, Personal history of other diseases of the respiratory system (10/08/2014), Personal history of other diseases of the respiratory system (11/14/2018), Personal history of other diseases of the respiratory system (09/28/2018), Personal history of other diseases of the respiratory system, Personal history of other medical treatment (05/08/2017), Personal history of other medical treatment (05/15/2019), Personal history of other specified conditions (09/26/2018), Personal history of other specified conditions (05/24/2018), Personal history of other specified conditions (08/20/2020), and Vulvar cyst (07/17/2014).       PAST SURGICAL HISTORY  Past Surgical History:   Procedure Laterality Date    COLONOSCOPY  10/29/2012    Complete Colonoscopy    FOOT SURGERY  10/29/2012    Foot Surgery    OTHER SURGICAL HISTORY  01/11/2014    Vaginal Pap smear     "TONSILLECTOMY  11/12/2012    Tonsillectomy       IMMUNIZATION HISTORY  Immunization History   Administered Date(s) Administered    Moderna COVID-19 vaccine, bivalent, blue cap/gray label *Check age/dose* 10/13/2022       SOCIAL HISTORY  She  reports that she has never smoked. She has never used smokeless tobacco. She reports current alcohol use. She reports that she does not use drugs. She Patient     OCCUPATIONAL/ENVIRONMENTAL HISTORY  Previously worked as:    DOES/DOES NOT: does not have known exposure to asbestos, silica, beryllium or inhaled metals.  DOES/DOES NOT: does not have exposure to birds or exotic animals.    FAMILY HISTORY  Family History   Problem Relation Name Age of Onset    Hemolytic uremic syndrome Father      Breast cancer Sister       DOES/DOES NOT: does not have a family history of pulmonary disease.  DOES/DOES NOT: does have a family history of cancer.  DOES/DOES NOT: does not have a family history of autoimmune disorders.    PHYSICAL EXAM     VITAL SIGNS: There were no vitals taken for this visit. There were no vitals taken for this visit. /76   Pulse 81   Resp 18   Ht 1.562 m (5' 1.5\")   Wt 54.2 kg (119 lb 6.4 oz)   SpO2 98%   BMI 22.20 kg/m²      PREVIOUS WEIGHTS:  Wt Readings from Last 3 Encounters:   02/25/25 52.6 kg (116 lb)   11/12/24 49.9 kg (110 lb)   08/23/24 51.3 kg (113 lb)       Physical Exam  Constitutional:       Appearance: Normal appearance.   HENT:      Head: Normocephalic and atraumatic.      Nose: Nose normal.      Mouth/Throat:      Mouth: Mucous membranes are moist.      Pharynx: Oropharynx is clear.   Eyes:      Extraocular Movements: Extraocular movements intact.      Pupils: Pupils are equal, round, and reactive to light.   Cardiovascular:      Rate and Rhythm: Normal rate and regular rhythm.      Pulses: Normal pulses.   Pulmonary:      Effort: Pulmonary effort is normal.      Breath sounds: Normal breath sounds.   Abdominal: "      General: Bowel sounds are normal.      Palpations: Abdomen is soft.   Musculoskeletal:         General: Normal range of motion.   Skin:     General: Skin is warm and dry.   Neurological:      General: No focal deficit present.      Mental Status: She is alert and oriented to person, place, and time. Mental status is at baseline.   Psychiatric:         Mood and Affect: Mood normal.         Behavior: Behavior normal.         Thought Content: Thought content normal.         Judgment: Judgment normal.           RESULTS/DATA     Pulmonary Function Test Results   12/13/2022        Chest Radiograph     No results found      Chest CT Scan     CT CHEST WO IV CONTRAST;  2/18/2025     FINDINGS:  No pneumothorax. No pleural effusion. Stable left upper lobe nodule  on image 56. Areas of mucous plugging seen scattered throughout the  lungs with bronchial thickening as well as small inflammatory  nodules. This extends to the lingula as well as right middle lobe.  New 6 mm right lower lobe nodule detected. Stable left upper lobe  scarring noted.      Main pulmonary artery measures 3.6 cm. No thoracic aneurysm. No  growing lymph nodes. Small sliding hiatal hernia.      Mild multilevel degenerative disc disease. Demineralization of the  bones. Stable adrenal glands.          IMPRESSION:  1.  Waxing and waning areas of mucous plugging as well as bronchial  thickening seen scattered throughout the lungs. Questioned left  apical lobe nodule is not changed. Imaging characteristics compatible  with the patient's history known mycobacterium infection.               Echocardiogram     No results found       Labwork   Complete Blood Count  Lab Results   Component Value Date    WBC 4.6 11/25/2024    HGB 14.4 11/25/2024    HCT 43.3 11/25/2024    MCV 99 11/25/2024     11/25/2024       Peripheral Eosinophil Count/Percentage:   Eosinophils Absolute (x10*3/uL)   Date Value   11/25/2024 0.18     Eosinophils % (%)   Date Value    11/25/2024 3.9       Serum Immunoglobulin E:    IgE (IU/mL)   Date Value   09/27/2023 7          ASSESSMENT/PLAN     Ms. Lackey is a 79 y.o. female and  has a past medical history of Abnormal findings on diagnostic imaging of other specified body structures (11/22/2017), Encounter for screening mammogram for malignant neoplasm of breast, Epigastric pain (10/08/2019), Gastro-esophageal reflux disease with esophagitis, without bleeding (12/13/2019), Nonscarring hair loss, unspecified (12/13/2019), Other acute recurrent sinusitis (11/07/2017), Other conditions influencing health status (12/13/2019), Other symptoms and signs involving general sensations and perceptions (09/28/2018), Personal history of other diseases of the nervous system and sense organs, Personal history of other diseases of the respiratory system (10/08/2014), Personal history of other diseases of the respiratory system (11/14/2018), Personal history of other diseases of the respiratory system (09/28/2018), Personal history of other diseases of the respiratory system, Personal history of other medical treatment (05/08/2017), Personal history of other medical treatment (05/15/2019), Personal history of other specified conditions (09/26/2018), Personal history of other specified conditions (05/24/2018), Personal history of other specified conditions (08/20/2020), and Vulvar cyst (07/17/2014). She presents to the [facility] for follow up of cough and MAC infection     Problem List and Orders      Assessment and Plan / Recommendations:    S/p Bronch: BAL +   Pseudomonas aeruginosa and e coli, + acid fast bacilli + Mycobacterium avium   Fungal culture negative -   Cytology - No malignant cells identified                GMS stain is negative for organisms: fungal and pneumocystis organisms.                Significant acute inflammation  Seen by ROBINSON Palacio In jan 2024  Repeat Ct chest - due in April/May   Will stop  "breo ellipta 2 puffs once a day, rinse after using - due to moderate obstruction and air trapping.   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 Mycobacteria are germs that live in the environment, particularly on wet or moist surfaces. This is why mycobacteria like MAC can be found in water faucets, shower heads, and soil. Because mycobacteria are found in so many places it is nearly impossible to avoid. Changing the shower head in your bathroom every 6 to 12 months might reduce your exposure to mycobacteria, but this is only hypothetically helpful. You cannot spread mycobacterial infections to others, so you do not need to worry about \"being contagious.\" We cannot totally explain why some people like yourself get mycobacteria infection in the lungs, but it is probably a combination of the lungs being a favorable or permissive environment for infection, genetic, and immune system factors. As we discussed in clinic, these infections are difficult to treat because they require multiple antibiotics and treatment durations lasting months. Treating the infection for 12+ months AFTER the FIRST negative sputum culture is important to reduce the risk of recurrent infection. Some data suggest that the difference in success rates between 12+ months of treatment and shorter lengths of treatment is on the order of ~80% versus ~20%. A good resource for more information about mycobacteria infection of the lungs is the web site: https://www.ntmfacts.com     Before starting treatment, we need to check several tests to make sure they are normal. If these tests are normal, it will allow me to safely prescribe the regimen. These tests include:  1. Complete blood count (rifampin and rifabutin can cause low white blood count) - this was taken 11/17/2023  2. Liver function tests (azithromycin, clarithromycin, " rifampin, and rifabutin can cause liver inflammation, a sign of toxicity) this was taken 11/17/2023  3. Electrocardiogram (azithromycin and clarithromycin can prolong the QT interval, which could trigger a life-threatening disturbance to heart rhythm) - this is needed   4. Eye examination (ethambutol can cause loss of color vision and/or decreased visual acuity due to irritation of the optic nerve) she is scheduled in March 2024  5. CT scan of the chest (we need to confirm the pattern of MAC infection because that will influence the intensity of the treatment regimen)  6. Sputum culture or bronchoscopy culture (to keep checking for growth of the MAC in the lungs) - completed 11/1/2023      Patient has been under surveillance from Dr Palacio in [facility] - CT chest 2/19/2025  Waxing and waning areas of mucous plugging as well as bronchial  thickening seen scattered throughout the lungs. Questioned left apical lobe nodule is not changed. Imaging characteristics compatible  with the patient's history known mycobacterium infection. - repeat in 6 months   - consider repeat PFT s     Thank you for visiting the Pulmonary clinic today!       Return to clinic after _3-6 months and  CT scan complete  or sooner if needed   Alicia Linares CNP  My office number is [phone] -     Best way to get a hold of me is to call my office --> Please do not send me follow my health messages  Any test results will be discussed at next visit -- please make sure to make a follow up appt after testing.

## 2025-03-20 ENCOUNTER — APPOINTMENT (OUTPATIENT)
Facility: CLINIC | Age: 80
End: 2025-03-20
Payer: MEDICARE

## 2025-03-20 VITALS
RESPIRATION RATE: 18 BRPM | OXYGEN SATURATION: 98 % | HEIGHT: 62 IN | BODY MASS INDEX: 21.97 KG/M2 | WEIGHT: 119.4 LBS | SYSTOLIC BLOOD PRESSURE: 127 MMHG | HEART RATE: 81 BPM | DIASTOLIC BLOOD PRESSURE: 76 MMHG

## 2025-03-20 DIAGNOSIS — A31.0 MYCOBACTERIUM AVIUM COMPLEX (MULTI): Primary | ICD-10-CM

## 2025-03-20 DIAGNOSIS — R05.3 CHRONIC COUGH: ICD-10-CM

## 2025-03-20 DIAGNOSIS — J45.20 INTERMITTENT ASTHMA, UNSPECIFIED ASTHMA SEVERITY, UNSPECIFIED WHETHER COMPLICATED (HHS-HCC): ICD-10-CM

## 2025-03-20 DIAGNOSIS — R91.1 LUNG NODULE: ICD-10-CM

## 2025-03-20 PROCEDURE — 99214 OFFICE O/P EST MOD 30 MIN: CPT | Performed by: NURSE PRACTITIONER

## 2025-03-20 PROCEDURE — 1036F TOBACCO NON-USER: CPT | Performed by: NURSE PRACTITIONER

## 2025-03-20 PROCEDURE — 1159F MED LIST DOCD IN RCRD: CPT | Performed by: NURSE PRACTITIONER

## 2025-03-20 PROCEDURE — 1157F ADVNC CARE PLAN IN RCRD: CPT | Performed by: NURSE PRACTITIONER

## 2025-03-20 RX ORDER — ALBUTEROL SULFATE 90 UG/1
1 INHALANT RESPIRATORY (INHALATION) ONCE
OUTPATIENT
Start: 2025-03-20

## 2025-03-20 RX ORDER — TIOTROPIUM BROMIDE INHALATION SPRAY 1.56 UG/1
2 SPRAY, METERED RESPIRATORY (INHALATION) DAILY
Qty: 4 G | Refills: 3 | Status: SHIPPED | OUTPATIENT
Start: 2025-03-20 | End: 2025-04-19

## 2025-03-20 RX ORDER — ALBUTEROL SULFATE 0.83 MG/ML
3 SOLUTION RESPIRATORY (INHALATION) ONCE
OUTPATIENT
Start: 2025-03-20 | End: 2025-03-20

## 2025-03-20 ASSESSMENT — ASTHMA QUESTIONNAIRES
QUESTION_1 LAST FOUR WEEKS HOW MUCH OF THE TIME DID YOUR ASTHMA KEEP YOU FROM GETTING AS MUCH DONE AT WORK, SCHOOL OR AT HOME: NONE OF THE TIME
QUESTION_2 LAST FOUR WEEKS HOW OFTEN HAVE YOU HAD SHORTNESS OF BREATH: NOT AT ALL
QUESTION_4 LAST FOUR WEEKS HOW OFTEN HAVE YOU USED YOUR RESCUE INHALER OR NEBULIZER MEDICATION (SUCH AS ALBUTEROL): 1 OR TWO TIMES PER DAY
QUESTION_5 LAST FOUR WEEKS HOW WOULD YOU RATE YOUR ASTHMA CONTROL: WELL CONTROLLED
QUESTION_3 LAST FOUR WEEKS HOW OFTEN DID YOUR ASTHMA SYMPTOMS (WHEEZING, COUGHING, SHORTNESS OF BREATH, CHEST TIGHTNESS OR PAIN) WAKE YOU UP AT NIGHT OR EARLIER THAN USUAL IN THE MORNING: NOT AT ALL
ACT_TOTALSCORE: 21

## 2025-03-20 ASSESSMENT — COLUMBIA-SUICIDE SEVERITY RATING SCALE - C-SSRS
2. HAVE YOU ACTUALLY HAD ANY THOUGHTS OF KILLING YOURSELF?: NO
1. IN THE PAST MONTH, HAVE YOU WISHED YOU WERE DEAD OR WISHED YOU COULD GO TO SLEEP AND NOT WAKE UP?: NO
6. HAVE YOU EVER DONE ANYTHING, STARTED TO DO ANYTHING, OR PREPARED TO DO ANYTHING TO END YOUR LIFE?: NO

## 2025-03-20 ASSESSMENT — PATIENT HEALTH QUESTIONNAIRE - PHQ9
1. LITTLE INTEREST OR PLEASURE IN DOING THINGS: NOT AT ALL
2. FEELING DOWN, DEPRESSED OR HOPELESS: NOT AT ALL
SUM OF ALL RESPONSES TO PHQ9 QUESTIONS 1 AND 2: 0

## 2025-04-25 DIAGNOSIS — J30.9 ALLERGIC RHINITIS, UNSPECIFIED SEASONALITY, UNSPECIFIED TRIGGER: ICD-10-CM

## 2025-04-25 RX ORDER — MONTELUKAST SODIUM 10 MG/1
10 TABLET ORAL DAILY
Qty: 90 TABLET | Refills: 3 | Status: SHIPPED | OUTPATIENT
Start: 2025-04-25

## 2025-05-02 DIAGNOSIS — I10 PRIMARY HYPERTENSION: ICD-10-CM

## 2025-05-02 RX ORDER — AMLODIPINE BESYLATE 2.5 MG/1
2.5 TABLET ORAL DAILY
Qty: 30 TABLET | Refills: 5 | Status: SHIPPED | OUTPATIENT
Start: 2025-05-02 | End: 2025-10-29

## 2025-05-27 DIAGNOSIS — K21.9 GASTROESOPHAGEAL REFLUX DISEASE WITHOUT ESOPHAGITIS: ICD-10-CM

## 2025-05-27 RX ORDER — PANTOPRAZOLE SODIUM 40 MG/1
40 TABLET, DELAYED RELEASE ORAL DAILY
Qty: 90 TABLET | Refills: 3 | Status: SHIPPED | OUTPATIENT
Start: 2025-05-27

## 2025-06-27 DIAGNOSIS — M19.90 OSTEOARTHRITIS, UNSPECIFIED OSTEOARTHRITIS TYPE, UNSPECIFIED SITE: ICD-10-CM

## 2025-06-27 RX ORDER — NAPROXEN 500 MG/1
500 TABLET ORAL DAILY PRN
Qty: 30 TABLET | Refills: 0 | Status: SHIPPED | OUTPATIENT
Start: 2025-06-27

## 2025-07-24 DIAGNOSIS — E78.5 HYPERLIPIDEMIA, UNSPECIFIED HYPERLIPIDEMIA TYPE: ICD-10-CM

## 2025-07-24 RX ORDER — FENOFIBRATE 200 MG/1
200 CAPSULE ORAL
Qty: 90 CAPSULE | Refills: 3 | Status: SHIPPED | OUTPATIENT
Start: 2025-07-24

## 2025-08-04 ENCOUNTER — TELEPHONE (OUTPATIENT)
Dept: PRIMARY CARE | Facility: CLINIC | Age: 80
End: 2025-08-04
Payer: MEDICARE

## 2025-08-04 DIAGNOSIS — M19.90 OSTEOARTHRITIS, UNSPECIFIED OSTEOARTHRITIS TYPE, UNSPECIFIED SITE: ICD-10-CM

## 2025-08-04 RX ORDER — NAPROXEN 500 MG/1
500 TABLET ORAL
Qty: 60 TABLET | Refills: 3 | Status: SHIPPED | OUTPATIENT
Start: 2025-08-04

## 2025-08-04 NOTE — TELEPHONE ENCOUNTER
Patient is asking for a refill on naproxin 500 mg. Last refill was changed with the directions to once a day. Patient takes this twice a day. Can this be switched back

## 2025-08-20 ENCOUNTER — HOSPITAL ENCOUNTER (OUTPATIENT)
Dept: RADIOLOGY | Facility: CLINIC | Age: 80
Discharge: HOME | End: 2025-08-20
Payer: MEDICARE

## 2025-08-20 DIAGNOSIS — A31.0 MYCOBACTERIUM AVIUM COMPLEX (MULTI): ICD-10-CM

## 2025-08-20 DIAGNOSIS — J45.20 INTERMITTENT ASTHMA, UNSPECIFIED ASTHMA SEVERITY, UNSPECIFIED WHETHER COMPLICATED (HHS-HCC): ICD-10-CM

## 2025-08-20 DIAGNOSIS — R91.1 LUNG NODULE: ICD-10-CM

## 2025-08-20 DIAGNOSIS — R05.3 CHRONIC COUGH: ICD-10-CM

## 2025-08-20 PROCEDURE — 71250 CT THORAX DX C-: CPT | Performed by: RADIOLOGY

## 2025-08-20 PROCEDURE — 71250 CT THORAX DX C-: CPT

## 2025-08-26 ENCOUNTER — APPOINTMENT (OUTPATIENT)
Dept: PRIMARY CARE | Facility: CLINIC | Age: 80
End: 2025-08-26
Payer: MEDICARE

## 2025-08-26 VITALS
OXYGEN SATURATION: 97 % | WEIGHT: 112 LBS | HEART RATE: 78 BPM | RESPIRATION RATE: 16 BRPM | BODY MASS INDEX: 20.82 KG/M2 | TEMPERATURE: 97 F | SYSTOLIC BLOOD PRESSURE: 118 MMHG | DIASTOLIC BLOOD PRESSURE: 70 MMHG

## 2025-08-26 DIAGNOSIS — E78.2 MIXED HYPERLIPIDEMIA: Primary | ICD-10-CM

## 2025-08-26 DIAGNOSIS — J47.9 BRONCHIECTASIS WITHOUT COMPLICATION (MULTI): ICD-10-CM

## 2025-08-26 DIAGNOSIS — M19.90 OSTEOARTHRITIS, UNSPECIFIED OSTEOARTHRITIS TYPE, UNSPECIFIED SITE: ICD-10-CM

## 2025-08-26 PROCEDURE — 1123F ACP DISCUSS/DSCN MKR DOCD: CPT | Performed by: INTERNAL MEDICINE

## 2025-08-26 PROCEDURE — 1036F TOBACCO NON-USER: CPT | Performed by: INTERNAL MEDICINE

## 2025-08-26 PROCEDURE — 1158F ADVNC CARE PLAN TLK DOCD: CPT | Performed by: INTERNAL MEDICINE

## 2025-08-26 PROCEDURE — 1160F RVW MEDS BY RX/DR IN RCRD: CPT | Performed by: INTERNAL MEDICINE

## 2025-08-26 PROCEDURE — G2211 COMPLEX E/M VISIT ADD ON: HCPCS | Performed by: INTERNAL MEDICINE

## 2025-08-26 PROCEDURE — 1159F MED LIST DOCD IN RCRD: CPT | Performed by: INTERNAL MEDICINE

## 2025-08-26 PROCEDURE — 99214 OFFICE O/P EST MOD 30 MIN: CPT | Performed by: INTERNAL MEDICINE

## 2025-08-27 DIAGNOSIS — J45.20 INTERMITTENT ASTHMA, UNSPECIFIED ASTHMA SEVERITY, UNSPECIFIED WHETHER COMPLICATED (HHS-HCC): ICD-10-CM

## 2025-08-27 DIAGNOSIS — R05.3 CHRONIC COUGH: ICD-10-CM

## 2025-08-27 RX ORDER — TIOTROPIUM BROMIDE INHALATION SPRAY 1.56 UG/1
2 SPRAY, METERED RESPIRATORY (INHALATION) DAILY
Qty: 4 G | Refills: 3 | Status: SHIPPED | OUTPATIENT
Start: 2025-08-27

## 2025-09-22 ENCOUNTER — APPOINTMENT (OUTPATIENT)
Facility: CLINIC | Age: 80
End: 2025-09-22
Payer: MEDICARE

## 2025-12-11 ENCOUNTER — APPOINTMENT (OUTPATIENT)
Dept: DERMATOLOGY | Facility: CLINIC | Age: 80
End: 2025-12-11
Payer: MEDICARE

## 2026-02-25 ENCOUNTER — APPOINTMENT (OUTPATIENT)
Dept: PRIMARY CARE | Facility: CLINIC | Age: 81
End: 2026-02-25
Payer: MEDICARE